# Patient Record
Sex: FEMALE | Race: WHITE | Employment: FULL TIME | URBAN - METROPOLITAN AREA
[De-identification: names, ages, dates, MRNs, and addresses within clinical notes are randomized per-mention and may not be internally consistent; named-entity substitution may affect disease eponyms.]

---

## 2017-01-27 ENCOUNTER — HOSPITAL ENCOUNTER (OUTPATIENT)
Dept: GENERAL RADIOLOGY | Age: 32
Discharge: HOME | End: 2017-01-27
Payer: COMMERCIAL

## 2017-01-27 DIAGNOSIS — M54.50 LOW BACK PAIN: ICD-10-CM

## 2017-01-27 PROCEDURE — 72170 X-RAY EXAM OF PELVIS: CPT

## 2018-11-13 ENCOUNTER — HOSPITAL ENCOUNTER (EMERGENCY)
Age: 33
Discharge: HOME | End: 2018-11-14
Attending: EMERGENCY MEDICINE
Payer: COMMERCIAL

## 2018-11-13 ENCOUNTER — APPOINTMENT (OUTPATIENT)
Dept: CT IMAGING | Age: 33
End: 2018-11-13
Attending: PHYSICIAN ASSISTANT
Payer: COMMERCIAL

## 2018-11-13 ENCOUNTER — OFFICE VISIT (OUTPATIENT)
Dept: URGENT CARE | Age: 33
End: 2018-11-13

## 2018-11-13 VITALS
WEIGHT: 125 LBS | OXYGEN SATURATION: 99 % | SYSTOLIC BLOOD PRESSURE: 145 MMHG | RESPIRATION RATE: 18 BRPM | TEMPERATURE: 98.7 F | HEIGHT: 64 IN | BODY MASS INDEX: 21.34 KG/M2 | DIASTOLIC BLOOD PRESSURE: 100 MMHG | HEART RATE: 97 BPM

## 2018-11-13 VITALS
DIASTOLIC BLOOD PRESSURE: 103 MMHG | OXYGEN SATURATION: 100 % | WEIGHT: 124.56 LBS | TEMPERATURE: 98.7 F | RESPIRATION RATE: 17 BRPM | BODY MASS INDEX: 21.27 KG/M2 | HEIGHT: 64 IN | HEART RATE: 80 BPM | SYSTOLIC BLOOD PRESSURE: 141 MMHG

## 2018-11-13 DIAGNOSIS — R63.0 DECREASED APPETITE: ICD-10-CM

## 2018-11-13 DIAGNOSIS — R10.31 ABDOMINAL PAIN, RIGHT LOWER QUADRANT: Primary | ICD-10-CM

## 2018-11-13 DIAGNOSIS — R10.30 LOWER ABDOMINAL PAIN: Primary | ICD-10-CM

## 2018-11-13 DIAGNOSIS — N20.0 KIDNEY STONES: ICD-10-CM

## 2018-11-13 DIAGNOSIS — R10.32 ABDOMINAL PAIN, LLQ (LEFT LOWER QUADRANT): ICD-10-CM

## 2018-11-13 DIAGNOSIS — R10.823 RIGHT LOWER QUADRANT ABDOMINAL TENDERNESS WITH REBOUND TENDERNESS: ICD-10-CM

## 2018-11-13 LAB
ALBUMIN SERPL-MCNC: 4 G/DL (ref 3.5–5)
ALBUMIN/GLOB SERPL: 1.1 {RATIO} (ref 1.1–2.2)
ALP SERPL-CCNC: 45 U/L (ref 45–117)
ALT SERPL-CCNC: 24 U/L (ref 12–78)
ANION GAP SERPL CALC-SCNC: 7 MMOL/L (ref 5–15)
AST SERPL-CCNC: 14 U/L (ref 15–37)
BILIRUB SERPL-MCNC: 0.4 MG/DL (ref 0.2–1)
BILIRUB UR QL STRIP: NEGATIVE
BUN SERPL-MCNC: 10 MG/DL (ref 6–20)
BUN/CREAT SERPL: 16 (ref 12–20)
CALCIUM SERPL-MCNC: 9.1 MG/DL (ref 8.5–10.1)
CHLORIDE SERPL-SCNC: 104 MMOL/L (ref 97–108)
CO2 SERPL-SCNC: 27 MMOL/L (ref 21–32)
CREAT SERPL-MCNC: 0.63 MG/DL (ref 0.55–1.02)
ERYTHROCYTE [DISTWIDTH] IN BLOOD BY AUTOMATED COUNT: 11.1 % (ref 11.5–14.5)
GLOBULIN SER CALC-MCNC: 3.5 G/DL (ref 2–4)
GLUCOSE SERPL-MCNC: 88 MG/DL (ref 65–100)
GLUCOSE UR-MCNC: NEGATIVE MG/DL
HCT VFR BLD AUTO: 36.4 % (ref 35–47)
HGB BLD-MCNC: 12.1 G/DL (ref 11.5–16)
KETONES P FAST UR STRIP-MCNC: NEGATIVE MG/DL
LIPASE SERPL-CCNC: 69 U/L (ref 73–393)
MCH RBC QN AUTO: 32.4 PG (ref 26–34)
MCHC RBC AUTO-ENTMCNC: 33.2 G/DL (ref 30–36.5)
MCV RBC AUTO: 97.6 FL (ref 80–99)
NRBC # BLD: 0 K/UL (ref 0–0.01)
NRBC BLD-RTO: 0 PER 100 WBC
PH UR STRIP: 6.5 [PH] (ref 4.6–8)
PLATELET # BLD AUTO: 256 K/UL (ref 150–400)
PMV BLD AUTO: 9.7 FL (ref 8.9–12.9)
POTASSIUM SERPL-SCNC: 4 MMOL/L (ref 3.5–5.1)
PROT SERPL-MCNC: 7.5 G/DL (ref 6.4–8.2)
PROT UR QL STRIP: NEGATIVE
RBC # BLD AUTO: 3.73 M/UL (ref 3.8–5.2)
SODIUM SERPL-SCNC: 138 MMOL/L (ref 136–145)
SP GR UR STRIP: 1.01 (ref 1–1.03)
UA UROBILINOGEN AMB POC: NORMAL (ref 0.2–1)
URINALYSIS CLARITY POC: NORMAL
URINALYSIS COLOR POC: NORMAL
URINE BLOOD POC: NORMAL
URINE LEUKOCYTES POC: NEGATIVE
URINE NITRITES POC: NEGATIVE
WBC # BLD AUTO: 7.4 K/UL (ref 3.6–11)

## 2018-11-13 PROCEDURE — 74176 CT ABD & PELVIS W/O CONTRAST: CPT

## 2018-11-13 PROCEDURE — 83690 ASSAY OF LIPASE: CPT

## 2018-11-13 PROCEDURE — 85027 COMPLETE CBC AUTOMATED: CPT

## 2018-11-13 PROCEDURE — 36415 COLL VENOUS BLD VENIPUNCTURE: CPT

## 2018-11-13 PROCEDURE — 80053 COMPREHEN METABOLIC PANEL: CPT

## 2018-11-13 PROCEDURE — 87210 SMEAR WET MOUNT SALINE/INK: CPT

## 2018-11-13 PROCEDURE — 81001 URINALYSIS AUTO W/SCOPE: CPT

## 2018-11-13 PROCEDURE — 99283 EMERGENCY DEPT VISIT LOW MDM: CPT

## 2018-11-13 PROCEDURE — 87491 CHLMYD TRACH DNA AMP PROBE: CPT

## 2018-11-13 NOTE — PATIENT INSTRUCTIONS
Go to one of the following below immediately without delay:    [address]   ([address])  Carlos Manuel Beyer   [phone]   Open 24 hours    [facility]   ([address])  Jami Ty   [phone] 24 hours    [place] [address]  ([address])  [address] [place] [address], [id number] [place] [address]   [phone]

## 2018-11-13 NOTE — PROGRESS NOTES
48 hours of progressively worsening lower abdominal pain  Improved at rest. Worse with any movement including walking  Associated: decreased appetite and distension sensation. Pain is non radiating. 7/10 with exacerbation. Has not tried any particular therapies for relief  Normal BMs daily. LMP 10/29/2018 lasting 7 days mildly more heavy than usual but not concerning to her. Is sexually active with . Denies any vaginal discharge. GI history: PSH tubal ligation, current work up with GI for possible celiac  Specifically denies: n/v/d, fever/chills, dysuria/increased urine frequency/urinary urgency, recent heavy lifting, constipation, vaginal discharge             No past medical history on file. Past Surgical History:   Procedure Laterality Date    HX GYN           Family History   Problem Relation Age of Onset    Hypertension Father         Social History     Socioeconomic History    Marital status:      Spouse name: Not on file    Number of children: Not on file    Years of education: Not on file    Highest education level: Not on file   Social Needs    Financial resource strain: Not on file    Food insecurity - worry: Not on file    Food insecurity - inability: Not on file   [employer] needs - medical: Not on file   [employer] needs - non-medical: Not on file   Occupational History    Not on file   Tobacco Use    Smoking status: Never Smoker    Smokeless tobacco: Former User   Substance and Sexual Activity    Alcohol use: Yes    Drug use: Not on file    Sexual activity: Not on file   Other Topics Concern    Not on file   Social History Narrative    Not on file                ALLERGIES: Codeine    Review of Systems   All other systems reviewed and are negative.       Vitals:    11/13/18 1632   BP: (!) 145/100   Pulse: 97   Resp: 18   Temp: 98.7 °F (37.1 °C)   SpO2: 99%   Weight: 125 lb (56.7 kg)   Height: 5' 4\" (1.626 m)       Physical Exam   Constitutional:   Non toxic appearing   HENT:   Mouth/Throat: Oropharynx is clear and moist.   Eyes: EOM are normal. Pupils are equal, round, and reactive to light. Cardiovascular: Regular rhythm and normal heart sounds. Exam reveals no gallop and no friction rub. No murmur heard. Apical pulse 104bpm   Pulmonary/Chest: Effort normal and breath sounds normal. No respiratory distress. She has no wheezes. She has no rales. Abdominal: Soft. Bowel sounds are normal. There is no hepatosplenomegaly. There is tenderness in the right lower quadrant. There is rebound. There is no rigidity, no guarding, no tenderness at McBurney's point and negative Mitchell's sign. MDM     Differential Diagnosis; Clinical Impression; Plan:       CLINICAL IMPRESSION:  (R10.30) Lower abdominal pain  (primary encounter diagnosis)  (R63.0) Decreased appetite  (R10.823) Right lower quadrant abdominal tenderness with rebound tenderness    Orders Placed This Encounter      AMB POC URINALYSIS DIP STICK AUTO W/ MICRO    DDx include appendicitis, adnexal pain, gastritis, urinary calculi, hernia    Plan:  UA with 1+ blood no GANESH or NIT. Have referred to ED r/o appendicitis; mildly tachy, RLQ pain on exam with rebound tenderness, decreased appetite. Will be riding by car. Aware to go to [facility] ED immediatly after discharge.             Procedures

## 2018-11-14 LAB
APPEARANCE UR: CLEAR
BACTERIA URNS QL MICRO: NEGATIVE /HPF
BILIRUB UR QL: NEGATIVE
C TRACH DNA SPEC QL NAA+PROBE: NEGATIVE
CLUE CELLS VAG QL WET PREP: NORMAL
COLOR UR: ABNORMAL
EPITH CASTS URNS QL MICRO: ABNORMAL /LPF
GLUCOSE UR STRIP.AUTO-MCNC: NEGATIVE MG/DL
HGB UR QL STRIP: ABNORMAL
HYALINE CASTS URNS QL MICRO: ABNORMAL /LPF (ref 0–5)
KETONES UR QL STRIP.AUTO: ABNORMAL MG/DL
KOH PREP SPEC: NORMAL
LEUKOCYTE ESTERASE UR QL STRIP.AUTO: NEGATIVE
N GONORRHOEA DNA SPEC QL NAA+PROBE: NEGATIVE
NITRITE UR QL STRIP.AUTO: NEGATIVE
PH UR STRIP: 6 [PH] (ref 5–8)
PROT UR STRIP-MCNC: NEGATIVE MG/DL
RBC #/AREA URNS HPF: ABNORMAL /HPF (ref 0–5)
SAMPLE TYPE: NORMAL
SERVICE CMNT-IMP: NORMAL
SERVICE CMNT-IMP: NORMAL
SP GR UR REFRACTOMETRY: 1.01 (ref 1–1.03)
SPECIMEN SOURCE: NORMAL
T VAGINALIS VAG QL WET PREP: NORMAL
UROBILINOGEN UR QL STRIP.AUTO: 0.2 EU/DL (ref 0.2–1)
WBC URNS QL MICRO: ABNORMAL /HPF (ref 0–4)

## 2018-11-14 RX ORDER — DICYCLOMINE HYDROCHLORIDE 20 MG/1
20 TABLET ORAL
Qty: 20 TAB | Refills: 0 | Status: SHIPPED | OUTPATIENT
Start: 2018-11-14 | End: 2019-05-11

## 2018-11-14 NOTE — ED PROVIDER NOTES
EMERGENCY DEPARTMENT HISTORY AND PHYSICAL EXAM      Date: 11/13/2018  Patient Name: Mariah Braynt    History of Presenting Illness     Chief Complaint   Patient presents with    Abdominal Pain     intermittent right side abdominal pain since Sunday. Denies N/V/D       History Provided By: Patient    HPI: Mariah Bryant, 28 y.o. female with no pertinent PMHx, presents ambulatory to the ED with cc of gradual onset, constant, diffuse lower abd pain x 2 days with associated mild nausea. Pain is described as a 2/10 soreness at rest, but jumping to an 8/10 upon movement or after eating. Pt states her symptoms began 2 days ago and persistence prompted the trip to the ED for evaluation. She reports going to Urgent Care earlier today who referred her here for further workup. Pt has a PSHx of a tubal ligation back in 2010. She has a FHx significant for HTN. Pt denies smoking with occasional EtOh consumption. Pt specifically denies constipation, urine symptoms, vaginal discharge, CP, SOB or a HA. There are no other complaints, changes, or physical findings at this time. PCP: Shane Davalos MD    Current Outpatient Medications   Medication Sig Dispense Refill    dicyclomine (BENTYL) 20 mg tablet Take 1 Tab by mouth every six (6) hours as needed (abdominal cramps) for up to 20 doses. 20 Tab 0    Cetirizine (ZYRTEC) 10 mg cap Take 10 mg by mouth daily.  fluticasone propionate (FLONASE ALLERGY RELIEF NA) by Nasal route daily. Past History     Past Medical History:  No past medical history on file. Past Surgical History:  Past Surgical History:   Procedure Laterality Date    HX GYN         Family History:  Family History   Problem Relation Age of Onset    Hypertension Father        Social History:  Social History     Tobacco Use    Smoking status: Never Smoker    Smokeless tobacco: Former User   Substance Use Topics    Alcohol use: Yes    Drug use: Not on file       Allergies:   Allergies Allergen Reactions    Codeine Nausea and Vomiting         Review of Systems   Review of Systems   Constitutional: Negative for fever. HENT: Negative for congestion. Eyes: Negative. Respiratory: Negative for shortness of breath. Cardiovascular: Negative for chest pain. Gastrointestinal: Positive for abdominal pain and nausea. Negative for constipation, diarrhea and vomiting. Endocrine: Negative for heat intolerance. Genitourinary: Negative for dysuria and vaginal discharge. Musculoskeletal: Negative for back pain. Skin: Negative for rash. Allergic/Immunologic: Negative for immunocompromised state. Neurological: Negative for headaches. Hematological: Does not bruise/bleed easily. Psychiatric/Behavioral: Negative. All other systems reviewed and are negative. Physical Exam   Physical Exam   Constitutional: She is oriented to person, place, and time. She appears well-developed and well-nourished. No distress. HENT:   Head: Normocephalic. Eyes: EOM are normal.   Neck: Normal range of motion. Neck supple. Cardiovascular: Normal rate, regular rhythm, normal heart sounds and intact distal pulses. Pulmonary/Chest: Effort normal and breath sounds normal. No respiratory distress. Abdominal: Soft. Bowel sounds are normal. There is tenderness (B/L lower quadrant). Genitourinary:   Genitourinary Comments: No discharge  No tenderness   Musculoskeletal: Normal range of motion. Neurological: She is alert and oriented to person, place, and time. Skin: Skin is warm and dry. Psychiatric: She has a normal mood and affect. Her behavior is normal.   Nursing note and vitals reviewed.       Diagnostic Study Results     Labs -     Recent Results (from the past 12 hour(s))   AMB POC URINALYSIS DIP STICK AUTO W/ MICRO    Collection Time: 11/13/18  4:41 PM   Result Value Ref Range    Color (UA POC)      Clarity (UA POC)      Glucose (UA POC) Negative Negative    Bilirubin (UA POC) Negative Negative    Ketones (UA POC) Negative Negative    Specific gravity (UA POC) 1.015 1.001 - 1.035    Blood (UA POC) 1+ Negative    pH (UA POC) 6.5 4.6 - 8.0    Protein (UA POC) Negative Negative    Urobilinogen (UA POC) 0.2 mg/dL 0.2 - 1    Nitrites (UA POC) Negative Negative    Leukocyte esterase (UA POC) Negative Negative   CBC W/O DIFF    Collection Time: 11/13/18  6:06 PM   Result Value Ref Range    WBC 7.4 3.6 - 11.0 K/uL    RBC 3.73 (L) 3.80 - 5.20 M/uL    HGB 12.1 11.5 - 16.0 g/dL    HCT 36.4 35.0 - 47.0 %    MCV 97.6 80.0 - 99.0 FL    MCH 32.4 26.0 - 34.0 PG    MCHC 33.2 30.0 - 36.5 g/dL    RDW 11.1 (L) 11.5 - 14.5 %    PLATELET 995 119 - 988 K/uL    MPV 9.7 8.9 - 12.9 FL    NRBC 0.0 0  WBC    ABSOLUTE NRBC 0.00 0.00 - 5.16 K/uL   METABOLIC PANEL, COMPREHENSIVE    Collection Time: 11/13/18  6:06 PM   Result Value Ref Range    Sodium 138 136 - 145 mmol/L    Potassium 4.0 3.5 - 5.1 mmol/L    Chloride 104 97 - 108 mmol/L    CO2 27 21 - 32 mmol/L    Anion gap 7 5 - 15 mmol/L    Glucose 88 65 - 100 mg/dL    BUN 10 6 - 20 MG/DL    Creatinine 0.63 0.55 - 1.02 MG/DL    BUN/Creatinine ratio 16 12 - 20      GFR est AA >60 >60 ml/min/1.73m2    GFR est non-AA >60 >60 ml/min/1.73m2    Calcium 9.1 8.5 - 10.1 MG/DL    Bilirubin, total 0.4 0.2 - 1.0 MG/DL    ALT (SGPT) 24 12 - 78 U/L    AST (SGOT) 14 (L) 15 - 37 U/L    Alk.  phosphatase 45 45 - 117 U/L    Protein, total 7.5 6.4 - 8.2 g/dL    Albumin 4.0 3.5 - 5.0 g/dL    Globulin 3.5 2.0 - 4.0 g/dL    A-G Ratio 1.1 1.1 - 2.2     LIPASE    Collection Time: 11/13/18  6:06 PM   Result Value Ref Range    Lipase 69 (L) 73 - 393 U/L   URINALYSIS W/ RFLX MICROSCOPIC    Collection Time: 11/13/18 11:27 PM   Result Value Ref Range    Color YELLOW/STRAW      Appearance CLEAR CLEAR      Specific gravity 1.015 1.003 - 1.030      pH (UA) 6.0 5.0 - 8.0      Protein NEGATIVE  NEG mg/dL    Glucose NEGATIVE  NEG mg/dL    Ketone TRACE (A) NEG mg/dL    Bilirubin NEGATIVE  NEG      Blood SMALL (A) NEG      Urobilinogen 0.2 0.2 - 1.0 EU/dL    Nitrites NEGATIVE  NEG      Leukocyte Esterase NEGATIVE  NEG      WBC 0-4 0 - 4 /hpf    RBC 5-10 0 - 5 /hpf    Epithelial cells FEW FEW /lpf    Bacteria NEGATIVE  NEG /hpf    Hyaline cast 0-2 0 - 5 /lpf   WET PREP    Collection Time: 11/13/18 11:27 PM   Result Value Ref Range    Clue cells CLUE CELLS ABSENT      Wet prep NO TRICHOMONAS SEEN     KOH, OTHER SOURCES    Collection Time: 11/13/18 11:27 PM   Result Value Ref Range    Special Requests: NO SPECIAL REQUESTS      KOH NO YEAST SEEN         Radiologic Studies -   CT Results  (Last 48 hours)               11/13/18 1938  CT ABD PELV WO CONT Final result    Impression:  IMPRESSION:    1. Nonobstructive bilateral nephrolithiasis. 2. Trace free fluid in the pelvis. Narrative:  INDICATION: Abdominal pain, intermittent right-sided abdominal pain since   sunday. Exam: Noncontrast CT of the abdomen and pelvis is performed with 5 mm   collimation. Sagittal and coronal reformatted images were also performed. CT dose reduction was achieved through the use of a standardized protocol   tailored for this examination and automatic exposure control for dose   modulation. There is no prior study for direct comparison. FINDINGS:       The visualized lung bases are clear. Abdomen:        Liver: The liver is normal on noncontrast images. Spleen: The spleen is normal on noncontrast images. Adrenals: The adrenals are normal on noncontrast images. Pancreas: The pancreas is normal on noncontrast images. Gallbladder: The gallbladder is normal on noncontrast images. Kidneys: There is bilateral nonobstructing nephrolithiasis. Largest right renal   calculus measures 9 mm in size. Largest left renal calculus measures 3 mm in   size. There is no hydronephrosis. Bowel: No thickened or dilated loop of large or small bowel seen.         Appendix: The appendix is not visualized, however there are no secondary signs   of acute appendicitis. Pelvis: Urinary bladder is partially filled and grossly normal.       Miscellaneous: There is trace free fluid in the pelvis. There is no free   intraperitoneal gas. There is no focal fluid collection to suggest abscess. Bilateral tubal ligation clips are noted. Medical Decision Making   I am the first provider for this patient. I reviewed the vital signs, available nursing notes, past medical history, past surgical history, family history and social history. Vital Signs-Reviewed the patient's vital signs. Patient Vitals for the past 12 hrs:   Temp Pulse Resp BP SpO2   11/13/18 1743 98.7 °F (37.1 °C) 80 17 (!) 141/103 100 %       Pulse Oximetry Analysis - 100% on RA    Cardiac Monitor:   Rate: 80 bpm  Rhythm: Normal Sinus Rhythm     Records Reviewed: Nursing Notes and Old Medical Records    Provider Notes (Medical Decision Making):   DDx: appendicitis, diverticulitis, ovarian cyst, UTI, PID, kidney stone, constipation    ED Course:   Initial assessment performed. The patients presenting problems have been discussed, and they are in agreement with the care plan formulated and outlined with them. I have encouraged them to ask questions as they arise throughout their visit. Procedure Note - Pelvic Exam:    11:15 PM  Performed by: Keila Matthew MD  Chaperoned by: RN  Pelvic exam was performed using bimanual and speculum. Further findings noted in physical exam.   The procedure took 1-15 minutes, and pt tolerated well. 12:19 AM  Pt has been re-evaluated and is ready for discharge. Critical Care Time:   0    Disposition:  DISCHARGE NOTE:  12:20 AM  The patient is ready for discharge. The patients signs, symptoms, diagnosis, and instructions for discharge have been discussed and the pt has conveyed their understanding.  The patient is to follow up as recommended or return to the ER should their symptoms worsen. Plan has been discussed and patient has conveyed their agreement. PLAN:  1. Discharge  Current Discharge Medication List      START taking these medications    Details   dicyclomine (BENTYL) 20 mg tablet Take 1 Tab by mouth every six (6) hours as needed (abdominal cramps) for up to 20 doses. Qty: 20 Tab, Refills: 0           2. Follow-up Information     Follow up With Specialties Details Why Contact Info    Chika Del Castillo MD Gastroenterology Call in 1 day  [address]      Shannan Benedict MD Internal Medicine  As needed [address] [place] [address] [phone]      [facility] EMERGENCY DEPT Emergency Medicine  If symptoms worsen [address]  [address] [facility]  [phone]        Return to ED if worse     Diagnosis     Clinical Impression:   1. Abdominal pain, right lower quadrant    2. Abdominal pain, LLQ (left lower quadrant)    3. Kidney stones        Attestations: This note is prepared by Cait Thompson, acting as Scribe for Rustam Baeza MD.    Rustam Baeza MD: The scribe's documentation has been prepared under my direction and personally reviewed by me in its entirety. I confirm that the note above accurately reflects all work, treatment, procedures, and medical decision making performed by me.

## 2018-11-14 NOTE — DISCHARGE INSTRUCTIONS
Abdominal Pain: Care Instructions  Your Care Instructions    Abdominal pain has many possible causes. Some aren't serious and get better on their own in a few days. Others need more testing and treatment. If your pain continues or gets worse, you need to be rechecked and may need more tests to find out what is wrong. You may need surgery to correct the problem. Don't ignore new symptoms, such as fever, nausea and vomiting, urination problems, pain that gets worse, and dizziness. These may be signs of a more serious problem. Your doctor may have recommended a follow-up visit in the next 8 to 12 hours. If you are not getting better, you may need more tests or treatment. The doctor has checked you carefully, but problems can develop later. If you notice any problems or new symptoms, get medical treatment right away. Follow-up care is a key part of your treatment and safety. Be sure to make and go to all appointments, and call your doctor if you are having problems. It's also a good idea to know your test results and keep a list of the medicines you take. How can you care for yourself at home? · Rest until you feel better. · To prevent dehydration, drink plenty of fluids, enough so that your urine is light yellow or clear like water. Choose water and other caffeine-free clear liquids until you feel better. If you have kidney, heart, or liver disease and have to limit fluids, talk with your doctor before you increase the amount of fluids you drink. · If your stomach is upset, eat mild foods, such as rice, dry toast or crackers, bananas, and applesauce. Try eating several small meals instead of two or three large ones. · Wait until 48 hours after all symptoms have gone away before you have spicy foods, alcohol, and drinks that contain caffeine. · Do not eat foods that are high in fat. · Avoid anti-inflammatory medicines such as aspirin, ibuprofen (Advil, Motrin), and naproxen (Aleve).  These can cause stomach upset. Talk to your doctor if you take daily aspirin for another health problem. When should you call for help? Call 911 anytime you think you may need emergency care. For example, call if:    · You passed out (lost consciousness).     · You pass maroon or very bloody stools.     · You vomit blood or what looks like coffee grounds.     · You have new, severe belly pain.    Call your doctor now or seek immediate medical care if:    · Your pain gets worse, especially if it becomes focused in one area of your belly.     · You have a new or higher fever.     · Your stools are black and look like tar, or they have streaks of blood.     · You have unexpected vaginal bleeding.     · You have symptoms of a urinary tract infection. These may include:  ? Pain when you urinate. ? Urinating more often than usual.  ? Blood in your urine.     · You are dizzy or lightheaded, or you feel like you may faint.    Watch closely for changes in your health, and be sure to contact your doctor if:    · You are not getting better after 1 day (24 hours). Where can you learn more? Go to http://katarina-patricia.info/. Enter D619 in the search box to learn more about \"Abdominal Pain: Care Instructions. \"  Current as of: November 20, 2017  Content Version: 11.8  © 5362-4464 [employer]. Care instructions adapted under license by [employer] (which disclaims liability or warranty for this information). If you have questions about a medical condition or this instruction, always ask your healthcare professional. [place] [address] any warranty or liability for your use of this information. Kidney Stone: Care Instructions  Your Care Instructions    Kidney stones are formed when salts, minerals, and other substances normally found in the urine clump together. They can be as small as grains of sand or, rarely, as large as golf balls.   While the stone is traveling through the ureter, which is the tube that carries urine from the kidney to the bladder, you will probably feel pain. The pain may be mild or very severe. You may also have some blood in your urine. As soon as the stone reaches the bladder, any intense pain should go away. If a stone is too large to pass on its own, you may need a medical procedure to help you pass the stone. The doctor has checked you carefully, but problems can develop later. If you notice any problems or new symptoms, get medical treatment right away. Follow-up care is a key part of your treatment and safety. Be sure to make and go to all appointments, and call your doctor if you are having problems. It's also a good idea to know your test results and keep a list of the medicines you take. How can you care for yourself at home? · Drink plenty of fluids, enough so that your urine is light yellow or clear like water. If you have kidney, heart, or liver disease and have to limit fluids, talk with your doctor before you increase the amount of fluids you drink. · Take pain medicines exactly as directed. Call your doctor if you think you are having a problem with your medicine. ? If the doctor gave you a prescription medicine for pain, take it as prescribed. ? If you are not taking a prescription pain medicine, ask your doctor if you can take an over-the-counter medicine. Read and follow all instructions on the label. · Your doctor may ask you to strain your urine so that you can collect your kidney stone when it passes. You can use a kitchen strainer or a tea strainer to catch the stone. Store it in a plastic bag until you see your doctor again. Preventing future kidney stones  Some changes in your diet may help prevent kidney stones. Depending on the cause of your stones, your doctor may recommend that you:  · Drink plenty of fluids, enough so that your urine is light yellow or clear like water.  If you have kidney, heart, or liver disease and have to limit fluids, talk with your doctor before you increase the amount of fluids you drink. · Limit coffee, tea, and alcohol. Also avoid grapefruit juice. · Do not take more than the recommended daily dose of vitamins C and D.  · Avoid antacids such as Gaviscon, Maalox, Mylanta, or Tums. · Limit the amount of salt (sodium) in your diet. · Eat a balanced diet that is not too high in protein. · Limit foods that are high in a substance called oxalate, which can cause kidney stones. These foods include dark green vegetables, rhubarb, chocolate, wheat bran, nuts, cranberries, and beans. When should you call for help? Call your doctor now or seek immediate medical care if:    · You cannot keep down fluids.     · Your pain gets worse.     · You have a fever or chills.     · You have new or worse pain in your back just below your rib cage (the flank area).     · You have new or more blood in your urine.    Watch closely for changes in your health, and be sure to contact your doctor if:    · You do not get better as expected. Where can you learn more? Go to http://katarina-patricia.info/. Enter C758 in the search box to learn more about \"Kidney Stone: Care Instructions. \"  Current as of: March 15, 2018  Content Version: 11.8  © 4417-2248 [employer]. Care instructions adapted under license by [employer] (which disclaims liability or warranty for this information). If you have questions about a medical condition or this instruction, always ask your healthcare professional. [place] [address] any warranty or liability for your use of this information.

## 2019-05-11 ENCOUNTER — OFFICE VISIT (OUTPATIENT)
Dept: URGENT CARE | Age: 34
End: 2019-05-11

## 2019-05-11 VITALS
BODY MASS INDEX: 22.02 KG/M2 | RESPIRATION RATE: 18 BRPM | WEIGHT: 129 LBS | HEIGHT: 64 IN | SYSTOLIC BLOOD PRESSURE: 166 MMHG | DIASTOLIC BLOOD PRESSURE: 108 MMHG | TEMPERATURE: 98.6 F | OXYGEN SATURATION: 98 % | HEART RATE: 88 BPM

## 2019-05-11 DIAGNOSIS — J06.9 ACUTE URI: ICD-10-CM

## 2019-05-11 DIAGNOSIS — H69.92 EUSTACHIAN TUBE DISORDER, LEFT: Primary | ICD-10-CM

## 2019-05-11 RX ORDER — PREDNISONE 20 MG/1
20 TABLET ORAL 2 TIMES DAILY
Qty: 10 TAB | Refills: 0 | Status: SHIPPED | OUTPATIENT
Start: 2019-05-11 | End: 2019-05-16

## 2019-05-11 NOTE — PATIENT INSTRUCTIONS
Fluids/ gargles  Claritin/ allegra   Tylenol cold-sinus - max strength 1-2 tab 4 times/ day    with Advil as needed    If not better in 4-5 days - may use prednisone       Eustachian Tube Problems: Care Instructions  Your Care Instructions    The eustachian (say \"you-STAY-shee-un\") tubes run between the inside of the ears and the throat. They keep air pressure stable in the ears. If your eustachian tubes become blocked, the air pressure in your ears changes. The fluids from a cold can clog eustachian tubes, causing pain in the ears. A quick change in air pressure can cause eustachian tubes to close up. This might happen when an airplane changes altitude or when a  goes up or down underwater. Eustachian tube problems often clear up on their own or after antibiotic treatment. If your tubes continue to be blocked, you may need surgery. Follow-up care is a key part of your treatment and safety. Be sure to make and go to all appointments, and call your doctor if you are having problems. It's also a good idea to know your test results and keep a list of the medicines you take. How can you care for yourself at home? · To ease ear pain, apply a warm washcloth or a heating pad set on low. There may be some drainage from the ear when the heat melts earwax. Put a cloth between the heat source and your skin. Do not use a heating pad with children. · If your doctor prescribed antibiotics, take them as directed. Do not stop taking them just because you feel better. You need to take the full course of antibiotics. · Your doctor may recommend over-the-counter medicine. Be safe with medicines. Oral or nasal decongestants may relieve ear pain. Avoid decongestants that are combined with antihistamines, which tend to cause more blockage. But if allergies seem to be the problem, your doctor may recommend a combination. Be careful with cough and cold medicines.  Don't give them to children younger than 6, because they don't work for children that age and can even be harmful. For children 6 and older, always follow all the instructions carefully. Make sure you know how much medicine to give and how long to use it. And use the dosing device if one is included. When should you call for help? Call your doctor now or seek immediate medical care if:    · You develop sudden, complete hearing loss.     · You have severe pain or feel dizzy.     · You have new or increasing pus or blood draining from your ear.     · You have redness, swelling, or pain around or behind the ear.    Watch closely for changes in your health, and be sure to contact your doctor if:    · You do not get better after 2 weeks.     · You have any new symptoms, such as itching or a feeling of fullness in the ear. Where can you learn more? Go to http://katarina-patricia.info/. Enter Y822 in the search box to learn more about \"Eustachian Tube Problems: Care Instructions. \"  Current as of: March 27, 2018  Content Version: 11.9  © 3649-2189 [employer]. Care instructions adapted under license by [employer] (which disclaims liability or warranty for this information). If you have questions about a medical condition or this instruction, always ask your healthcare professional. [address] any warranty or liability for your use of this information.

## 2019-05-12 NOTE — PROGRESS NOTES
Cold Symptoms   The history is provided by the patient. This is a new problem. The problem occurs constantly. Associated symptoms include ear congestion and ear pain. She has tried nothing for the symptoms. She is not a smoker. Her past medical history does not include asthma. History reviewed. No pertinent past medical history. Past Surgical History:   Procedure Laterality Date    HX GYN           Family History   Problem Relation Age of Onset    Hypertension Father         Social History     Socioeconomic History    Marital status:      Spouse name: Not on file    Number of children: Not on file    Years of education: Not on file    Highest education level: Not on file   Occupational History    Not on file   Social Needs    Financial resource strain: Not on file    Food insecurity:     Worry: Not on file     Inability: Not on file    Transportation needs:     Medical: Not on file     Non-medical: Not on file   Tobacco Use    Smoking status: Never Smoker    Smokeless tobacco: Former User   Substance and Sexual Activity    Alcohol use: Yes    Drug use: Not on file    Sexual activity: Not on file   Lifestyle    Physical activity:     Days per week: Not on file     Minutes per session: Not on file    Stress: Not on file   Relationships    Social connections:     Talks on phone: Not on file     Gets together: Not on file     Attends Hinduism service: Not on file     Active member of club or organization: Not on file     Attends meetings of clubs or organizations: Not on file     Relationship status: Not on file    Intimate partner violence:     Fear of current or ex partner: Not on file     Emotionally abused: Not on file     Physically abused: Not on file     Forced sexual activity: Not on file   Other Topics Concern    Not on file   Social History Narrative    Not on file                ALLERGIES: Codeine    Review of Systems   Constitutional: Negative for fatigue.    HENT: Positive for congestion, ear pain and sinus pain. All other systems reviewed and are negative. Vitals:    05/11/19 1354   BP: (!) 166/108   Pulse: 88   Resp: 18   Temp: 98.6 °F (37 °C)   SpO2: 98%   Weight: 129 lb (58.5 kg)   Height: 5' 4\" (1.626 m)       Physical Exam   Constitutional: No distress. HENT:   Right Ear: Tympanic membrane and ear canal normal.   Left Ear: Tympanic membrane and ear canal normal.   Nose: Nose normal.   Mouth/Throat: No oropharyngeal exudate, posterior oropharyngeal edema or posterior oropharyngeal erythema. Eyes: Conjunctivae are normal. Right eye exhibits no discharge. Left eye exhibits no discharge. Neck: Neck supple. Pulmonary/Chest: Effort normal and breath sounds normal. No respiratory distress. She has no wheezes. She has no rales. Lymphadenopathy:     She has no cervical adenopathy. Skin: No rash noted. Nursing note and vitals reviewed. MDM    Procedures      ICD-10-CM ICD-9-CM    1. Eustachian tube disorder, left H69.92 381.9    2. Acute URI J06.9 465.9      Fluids/ gargles  Claritin/ allegra   Tylenol cold-sinus - max strength 1-2 tab 4 times/ day    with Advil as needed          Medications Ordered Today   Medications    predniSONE (DELTASONE) 20 mg tablet     Sig: Take 20 mg by mouth two (2) times a day for 5 days. With food     Dispense:  10 Tab     Refill:  0     No results found for any visits on 05/11/19. The patients condition was discussed with the patient and they understand. The patient is to follow up with primary care doctor. If signs and symptoms become worse the pt is to go to the ER. The patient is to take medications as prescribed.

## 2019-09-19 ENCOUNTER — OFFICE VISIT (OUTPATIENT)
Dept: URGENT CARE | Age: 34
End: 2019-09-19

## 2019-09-19 VITALS
WEIGHT: 129 LBS | TEMPERATURE: 98.5 F | HEART RATE: 64 BPM | RESPIRATION RATE: 18 BRPM | HEIGHT: 64 IN | BODY MASS INDEX: 22.02 KG/M2 | DIASTOLIC BLOOD PRESSURE: 96 MMHG | OXYGEN SATURATION: 100 % | SYSTOLIC BLOOD PRESSURE: 139 MMHG

## 2019-09-19 DIAGNOSIS — J01.00 ACUTE NON-RECURRENT MAXILLARY SINUSITIS: Primary | ICD-10-CM

## 2019-09-19 RX ORDER — AMOXICILLIN 875 MG/1
875 TABLET, FILM COATED ORAL EVERY 12 HOURS
Qty: 20 TAB | Refills: 0 | Status: SHIPPED | OUTPATIENT
Start: 2019-09-19 | End: 2019-09-29

## 2019-09-19 NOTE — PROGRESS NOTES
Layo Flaherty presents with bilateral worsening maxillary sinus pressure and pain for the past 1.5 weeks. Reports had congestion and ST at onset, now improved. Denies fever, chills. Reports mild cough. Sinus pain worse when moving head forward. Takes zyrtec and flonase daily. Used sudafed without improvement. The history is provided by the patient. History reviewed. No pertinent past medical history. Past Surgical History:   Procedure Laterality Date    HX GYN           Family History   Problem Relation Age of Onset    Hypertension Father         Social History     Socioeconomic History    Marital status:      Spouse name: Not on file    Number of children: Not on file    Years of education: Not on file    Highest education level: Not on file   Occupational History    Not on file   Social Needs    Financial resource strain: Not on file    Food insecurity:     Worry: Not on file     Inability: Not on file    Transportation needs:     Medical: Not on file     Non-medical: Not on file   Tobacco Use    Smoking status: Never Smoker    Smokeless tobacco: Former User   Substance and Sexual Activity    Alcohol use:  Yes    Drug use: Not on file    Sexual activity: Not on file   Lifestyle    Physical activity:     Days per week: Not on file     Minutes per session: Not on file    Stress: Not on file   Relationships    Social connections:     Talks on phone: Not on file     Gets together: Not on file     Attends Adventism service: Not on file     Active member of club or organization: Not on file     Attends meetings of clubs or organizations: Not on file     Relationship status: Not on file    Intimate partner violence:     Fear of current or ex partner: Not on file     Emotionally abused: Not on file     Physically abused: Not on file     Forced sexual activity: Not on file   Other Topics Concern    Not on file   Social History Narrative    Not on file                ALLERGIES: Codeine    Review of Systems   Constitutional: Negative for activity change, appetite change, chills and fever. HENT: Positive for congestion, rhinorrhea, sinus pressure and sinus pain. Negative for ear pain, sore throat and trouble swallowing. Respiratory: Positive for cough. Negative for shortness of breath and wheezing. Cardiovascular: Negative for chest pain and palpitations. Gastrointestinal: Negative for nausea and vomiting. Musculoskeletal: Positive for neck pain. Neurological: Negative for dizziness and headaches. Hematological: Positive for adenopathy. Vitals:    09/19/19 1739   BP: (!) 139/96   Pulse: 64   Resp: 18   Temp: 98.5 °F (36.9 °C)   SpO2: 100%   Weight: 129 lb (58.5 kg)   Height: 5' 4\" (1.626 m)       Physical Exam   Constitutional: She appears well-developed and well-nourished. No distress. HENT:   Right Ear: Tympanic membrane, external ear and ear canal normal.   Left Ear: Tympanic membrane, external ear and ear canal normal.   Nose: Right sinus exhibits maxillary sinus tenderness. Right sinus exhibits no frontal sinus tenderness. Left sinus exhibits maxillary sinus tenderness. Left sinus exhibits no frontal sinus tenderness. Mouth/Throat: Mucous membranes are normal. Posterior oropharyngeal erythema present. No oropharyngeal exudate, posterior oropharyngeal edema or tonsillar abscesses. Cardiovascular: Normal rate, regular rhythm and normal heart sounds. Pulmonary/Chest: Effort normal and breath sounds normal. No respiratory distress. She has no wheezes. She has no rales. Lymphadenopathy:     She has cervical adenopathy. Neurological: She is alert. Skin: She is not diaphoretic. Psychiatric: She has a normal mood and affect. Her behavior is normal. Judgment and thought content normal.   Nursing note and vitals reviewed. [facility]    ICD-10-CM ICD-9-CM   1.  Acute non-recurrent maxillary sinusitis J01.00 461.0     Medications Ordered Today   Medications    amoxicillin (AMOXIL) 875 mg tablet     Sig: Take 1 Tab by mouth every twelve (12) hours for 10 days. Dispense:  20 Tab     Refill:  0     Continue zyrtec and flonase daily. The patients condition was discussed with the patient and they understand. The patient is to follow up with PCP. If signs and symptoms become worse the pt is to go to the ER. The patient is to take medications as prescribed. AVS given with patient instructions.             Procedures

## 2019-09-19 NOTE — PATIENT INSTRUCTIONS
Sinusitis: Care Instructions  Your Care Instructions    Sinusitis is an infection of the lining of the sinus cavities in your head. Sinusitis often follows a cold. It causes pain and pressure in your head and face. In most cases, sinusitis gets better on its own in 1 to 2 weeks. But some mild symptoms may last for several weeks. Sometimes antibiotics are needed. Follow-up care is a key part of your treatment and safety. Be sure to make and go to all appointments, and call your doctor if you are having problems. It's also a good idea to know your test results and keep a list of the medicines you take. How can you care for yourself at home? · Take an over-the-counter pain medicine, such as acetaminophen (Tylenol), ibuprofen (Advil, Motrin), or naproxen (Aleve). Read and follow all instructions on the label. · If the doctor prescribed antibiotics, take them as directed. Do not stop taking them just because you feel better. You need to take the full course of antibiotics. · Be careful when taking over-the-counter cold or flu medicines and Tylenol at the same time. Many of these medicines have acetaminophen, which is Tylenol. Read the labels to make sure that you are not taking more than the recommended dose. Too much acetaminophen (Tylenol) can be harmful. · Breathe warm, moist air from a steamy shower, a hot bath, or a sink filled with hot water. Avoid cold, dry air. Using a humidifier in your home may help. Follow the directions for cleaning the machine. · Use saline (saltwater) nasal washes to help keep your nasal passages open and wash out mucus and bacteria. You can buy saline nose drops at a grocery store or drugstore. Or you can make your own at home by adding 1 teaspoon of salt and 1 teaspoon of baking soda to 2 cups of distilled water. If you make your own, fill a bulb syringe with the solution, insert the tip into your nostril, and squeeze gently. [employer] your nose.   · Put a hot, wet towel or a warm gel pack on your face 3 or 4 times a day for 5 to 10 minutes each time. · Try a decongestant nasal spray like oxymetazoline (Afrin). Do not use it for more than 3 days in a row. Using it for more than 3 days can make your congestion worse. When should you call for help? Call your doctor now or seek immediate medical care if:    · You have new or worse swelling or redness in your face or around your eyes.     · You have a new or higher fever.    Watch closely for changes in your health, and be sure to contact your doctor if:    · You have new or worse facial pain.     · The mucus from your nose becomes thicker (like pus) or has new blood in it.     · You are not getting better as expected. Where can you learn more? Go to http://katarina-patricia.info/. Enter C709 in the search box to learn more about \"Sinusitis: Care Instructions. \"  Current as of: October 21, 2018  Content Version: 12.1  © 9416-0686 [employer]. Care instructions adapted under license by [employer] (which disclaims liability or warranty for this information). If you have questions about a medical condition or this instruction, always ask your healthcare professional. [place] [address] any warranty or liability for your use of this information.

## 2020-02-09 ENCOUNTER — HOSPITAL ENCOUNTER (OUTPATIENT)
Dept: MRI IMAGING | Age: 35
Discharge: HOME | End: 2020-02-09
Attending: PHYSICIAN ASSISTANT
Payer: COMMERCIAL

## 2020-02-09 ENCOUNTER — HOSPITAL ENCOUNTER (OUTPATIENT)
Dept: GENERAL RADIOLOGY | Age: 35
Discharge: HOME | End: 2020-02-09
Attending: PHYSICIAN ASSISTANT
Payer: COMMERCIAL

## 2020-02-09 DIAGNOSIS — R10.30 LOWER ABDOMINAL PAIN: ICD-10-CM

## 2020-02-09 DIAGNOSIS — R19.7 DIARRHEA: ICD-10-CM

## 2020-02-09 PROCEDURE — 74018 RADEX ABDOMEN 1 VIEW: CPT

## 2020-02-09 PROCEDURE — A9585 GADOBUTROL INJECTION: HCPCS

## 2020-02-09 PROCEDURE — 74183 MRI ABD W/O CNTR FLWD CNTR: CPT

## 2020-02-09 PROCEDURE — 74011250636 HC RX REV CODE- 250/636

## 2020-03-20 ENCOUNTER — OFFICE VISIT (OUTPATIENT)
Dept: HEMATOLOGY | Age: 35
End: 2020-03-20

## 2020-03-20 VITALS
TEMPERATURE: 98 F | OXYGEN SATURATION: 98 % | SYSTOLIC BLOOD PRESSURE: 106 MMHG | HEIGHT: 64 IN | HEART RATE: 69 BPM | WEIGHT: 129 LBS | BODY MASS INDEX: 22.02 KG/M2 | DIASTOLIC BLOOD PRESSURE: 80 MMHG

## 2020-03-20 DIAGNOSIS — R16.0 LIVER MASS: Primary | ICD-10-CM

## 2020-03-20 PROBLEM — K58.9 IBS (IRRITABLE BOWEL SYNDROME): Status: ACTIVE | Noted: 2020-03-20

## 2020-03-20 RX ORDER — FOLIC ACID 1 MG/1
TABLET ORAL DAILY
COMMUNITY
End: 2022-10-21

## 2020-03-20 RX ORDER — LANOLIN ALCOHOL/MO/W.PET/CERES
1000 CREAM (GRAM) TOPICAL DAILY
COMMUNITY

## 2020-03-20 NOTE — PROGRESS NOTES
[facility], MD, Juju Gonzalez MD [facility], LOPEZ Castillo, [facility]     Juli Adams, [facility]   TERRI Rodriges-SVEN Curtis, [facility]       Jean-Pierre Zapata [address]    at [address], [address] Fabiola Hernandez  22.    [phone]    FAX: [address] [place] [address], [address]    [phone]    FAX: [phone]       Patient Care Team:  Shannan Coon MD as PCP - General (Internal Medicine)  Shannan Coon MD as PCP - [facility]  Rosemarie Yang MD (Family Practice)      Problem List  Date Reviewed: 11/13/2018          Codes Class Noted    Liver mass ICD-10-CM: R16.0  ICD-9-CM: 573.8  3/20/2020        IBS (irritable bowel syndrome) ICD-10-CM: K58.9  ICD-9-CM: 564.1  3/20/2020              The clinicians listed above have asked me to see Noelle Mosqueda in consultation regarding a liver mass. All medical records sent by the referring physicians were reviewed including imaging studies     The patient is a 27 y.o.  female without any history of previous liver disease. The patient underwent a MRI for evaluation of non-specific abdominal pain in 2/2020. This demonstrated a single mass measuring 3.2 x 3.5 cm in the right lobe, segment 8    There was a non-contrast CT scan performed in 11/2028 that did not show a liver mass. The patient had been taking estrogen and/or progesterone for birth control since 2000. These medications were stopped 2/2020. Imaging studies of the liver suggest a normal liver     The patient has no symptoms which can be attributed to the liver disorder.     The patient is not currently experiencing the following symptoms of liver disease:  fatigue, pain in the right side over the liver,     The patient completes all daily activities without any functional limitations. ASSESSMENT AND PLAN:  Liver mass   This is most likely to be benign and either Focal Nodular Hyperplasia or hepatic adenoma. The mass was not seen on the Ct scan in 11/2018 because no contrast was utilized. In retrospect once the lesion was seen on MRI a faint lesion can be seen on the CT. She is now off [place]. If this is adenoma it is likely to reduce in size. Even if it does not it is below the thereshold of 5 cm where the risk of [address] is increased. Will repeat MRI in 6 months with [facility].      The patient is asymptomatic     Laboratory studies and imaging suggest the patient has no evidence of liver disease. Will measure the following serologic cancer markers AFP, CA 19-9, CEA. All were negative      Serologic testing for causes of chronic liver disease were ordered. Results were negative for HBV, HCV    Treatment of other medical problems in patients with chronic liver disease  There are no contraindications for the patient to take most medications that are necessary for treatment of other medical issues. Counseling for alcohol in patients with chronic liver disease  The patient was counseled regarding alcohol consumption and the effect of alcohol on chronic liver disease. The patient does not consume any significant amount of alcohol. Vaccinations   Since the patient does not have a chronic liver disease which can lead to liver injury screening for HAV and HBV is not needed. Routine vaccinations against other bacterial and viral agents can be performed as indicated. Annual flu vaccination should be administered if indicated.       ALLERGIES  Allergies   Allergen Reactions    Codeine Nausea and Vomiting       MEDICATIONS  Current Outpatient Medications   Medication Sig    linaCLOtide (Linzess) 72 mcg cap capsule Take  by mouth Daily (before breakfast).  cyanocobalamin 1,000 mcg tablet Take 1,000 mcg by mouth daily.  folic acid (FOLVITE) 1 mg tablet Take  by mouth daily.  Cetirizine (ZYRTEC) 10 mg cap Take 10 mg by mouth daily.  fluticasone (FLONASE) 50 mcg/actuation nasal spray 2 Sprays by Both Nostrils route daily. No current facility-administered medications for this visit. SYSTEM REVIEW NOT RELATED TO LIVER DISEASE OR REVIEWED ABOVE:  Constitution systems: Negative for fever, chills, weight gain, weight loss. Eyes: Negative for visual changes. ENT: Negative for sore throat, painful swallowing. Respiratory: Negative for cough, hemoptysis, SOB. Cardiology: Negative for chest pain, palpitations. GI:  Negative for constipation or diarrhea. : Negative for urinary frequency, dysuria, hematuria, nocturia. Skin: Negative for rash. Hematology: Negative for easy bruising, blood clots. Musculo-skelatal: Negative for back pain, muscle pain, weakness. Neurologic: Negative for headaches, dizziness, vertigo, memory problems not related to HE. Psychology: Negative for anxiety, depression. FAMILY HISTORY:  The father Has/had the following following chronic disease(s): HTN. The mother Has/had the following chronic disease(s): None. There is no family history of liver disease. SOCIAL HISTORY:  The patient is . The patient has 2 children,   The patient has never used tobacco products. The patient consumes alcohol on social occasions never in excess. The patient currently works full time as school nurse. PHYSICAL EXAMINATION:  Visit Vitals  /80 (BP 1 Location: Left arm, BP Patient Position: Sitting)   Pulse 69   Temp 98 °F (36.7 °C) (Tympanic)   Ht 5' 4\" (1.626 m)   Wt 129 lb (58.5 kg)   SpO2 98%   BMI 22.14 kg/m²     General: No acute distress. Eyes: Sclera anicteric. ENT: No oral lesions. Thyroid normal.  Nodes: No adenopathy. Skin: No spider angiomata. No jaundice. No palmar erythema. Respiratory: Lungs clear to auscultation. Cardiovascular: Regular heart rate. No murmurs. No JVD. Abdomen: Soft non-tender. Liver size normal to percussion/palpation. Spleen not palpable. No obvious ascites. Extremities: No edema. No muscle wasting. No gross arthritic changes. Neurologic: Alert and oriented. Cranial nerves grossly intact. No asterixis. LABORATORY STUDIES:  Liver [place] of [address] Units 11/13/2018   WBC 3.6 - 11.0 K/uL 7.4   HGB 11.5 - 16.0 g/dL 12.1    - 400 K/uL 256   AST 15 - 37 U/L 14 (L)   ALT 12 - 78 U/L 24   Alk Phos 45 - 117 U/L 45   Bili, Total 0.2 - 1.0 MG/DL 0.4   Albumin 3.5 - 5.0 g/dL 4.0   BUN 6 - 20 MG/DL 10   Creat 0.55 - 1.02 MG/DL 0.63   Na 136 - 145 mmol/L 138   K 3.5 - 5.1 mmol/L 4.0   Cl 97 - 108 mmol/L 104   CO2 21 - 32 mmol/L 27   Glucose 65 - 100 mg/dL 88     Cancer Screening Latest Ref Rng & Units 3/20/2020   AFP, Serum 0.0 - 8.0 ng/mL 1.1   AFP-L3% 0.0 - 9.9 % Comment   CA 19-9 0 - 35 U/mL 9   CEA 0.0 - 4.7 ng/mL 0.4     SEROLOGIES:  Serologies Latest Ref Rng & Units 3/20/2020   Hep B Surface Ag Negative Negative   Hep C Ab 0.0 - 0.9 s/co ratio <0.1     LIVER HISTOLOGY:  Not available or performed    ENDOSCOPIC PROCEDURES:  Not available or performed    RADIOLOGY:  11/2018. CT scan abdomen without IV contrast.  Normal appearing liver. No liver mass lesions. Normal spleen. No ascites. 2/2020. Dynamic MRI of liver. Normal appearing liver. Enhancing liver mass with washout and no rim enhancement on delayed images in the right lobe, segment 8measuring 3.2 x 3.5 cm. No central scar. Most consistent with adenoma or focal nodular hyperplasia. OTHER TESTING:  Not available or performed    FOLLOW-UP:  All of the issues listed above in the Assessment and Plan were discussed with the patient. All questions were answered. The patient expressed a clear understanding of the above.     Irina Dang [facility] in 4 months which should be 1-2 weeks after the next imaging study.         Chaz Quiroz MD  [facility] [address], [address] [place] [address] Fabiola Bah  22.  [phone]  [address]

## 2020-03-20 NOTE — PROGRESS NOTES
Bekah Casanova is a 27 y.o. female  Chief Complaint   Patient presents with    New Patient     Hepatic Lesions         Visit Vitals  /80 (BP 1 Location: Left arm, BP Patient Position: Sitting)   Pulse 69   Temp 98 °F (36.7 °C) (Tympanic)   Ht 5' 4\" (1.626 m)   Wt 129 lb (58.5 kg)   SpO2 98%   BMI 22.14 kg/m²     3 most recent PHQ Screens 3/20/2020   Little interest or pleasure in doing things Not at all   Feeling down, depressed, irritable, or hopeless Not at all   Total Score PHQ 2 0     Learning Assessment 3/20/2020   PRIMARY LEARNER Patient   BARRIERS PRIMARY LEARNER NONE   CO-LEARNER CAREGIVER No   PRIMARY LANGUAGE ENGLISH   LEARNER PREFERENCE PRIMARY DEMONSTRATION   ANSWERED BY patient   RELATIONSHIP SELF     Abuse Screening Questionnaire 3/20/2020   Do you ever feel afraid of your partner? N   Are you in a relationship with someone who physically or mentally threatens you? N   Is it safe for you to go home?  Brandy Santos

## 2020-03-20 NOTE — Clinical Note
3/29/20 Patient: Marlo Torres YOB: 1985 Date of Visit: 3/20/2020 Jil Mitchell MD 
[facility] [address] VIA Facsimile: [phone] Dear Jil Mitchell MD, Thank you for referring Ms. Marlo Torres to [address] for evaluation. My notes for this consultation are attached. If you have questions, please do not hesitate to call me. I look forward to following your patient along with you. Sincerely, Oziel Dickson MD

## 2020-03-21 LAB
CANCER AG19-9 SERPL-ACNC: 9 U/ML (ref 0–35)
CEA SERPL-MCNC: 0.4 NG/ML (ref 0–4.7)
COMMENT, 144067: NORMAL
HBV SURFACE AG SERPL QL IA: NEGATIVE
HCV AB S/CO SERPL IA: <0.1 S/CO RATIO (ref 0–0.9)

## 2020-03-23 LAB
AFP L3 MFR SERPL: NORMAL % (ref 0–9.9)
AFP SERPL-MCNC: 1.1 NG/ML (ref 0–8)

## 2020-07-06 ENCOUNTER — HOSPITAL ENCOUNTER (OUTPATIENT)
Dept: MRI IMAGING | Age: 35
Discharge: HOME | End: 2020-07-06
Attending: INTERNAL MEDICINE

## 2020-07-06 ENCOUNTER — TELEPHONE (OUTPATIENT)
Dept: HEMATOLOGY | Age: 35
End: 2020-07-06

## 2020-07-06 DIAGNOSIS — R16.0 LIVER MASS: ICD-10-CM

## 2020-07-06 NOTE — TELEPHONE ENCOUNTER
Patient states she was unable to get MRI today due to \"something with the contrast.\"  She needed a different kind she thinks? She said you spoke with the radiologist about it?     Maybe change to [facility]?

## 2020-07-16 ENCOUNTER — HOSPITAL ENCOUNTER (OUTPATIENT)
Dept: MRI IMAGING | Age: 35
Discharge: HOME | End: 2020-07-16
Attending: INTERNAL MEDICINE
Payer: COMMERCIAL

## 2020-07-16 PROCEDURE — A9581 GADOXETATE DISODIUM INJ: HCPCS | Performed by: INTERNAL MEDICINE

## 2020-07-16 PROCEDURE — 77030021566 MRI ABD W WO CONT

## 2020-07-16 PROCEDURE — 74011250636 HC RX REV CODE- 250/636: Performed by: INTERNAL MEDICINE

## 2020-07-16 PROCEDURE — 74011000258 HC RX REV CODE- 258: Performed by: INTERNAL MEDICINE

## 2020-07-16 RX ORDER — SODIUM CHLORIDE 0.9 % (FLUSH) 0.9 %
10 SYRINGE (ML) INJECTION
Status: COMPLETED | OUTPATIENT
Start: 2020-07-16 | End: 2020-07-16

## 2020-07-16 RX ADMIN — GADOXETATE DISODIUM 6 ML: 181.43 INJECTION, SOLUTION INTRAVENOUS at 10:23

## 2020-07-16 RX ADMIN — SODIUM CHLORIDE 100 ML: 900 INJECTION, SOLUTION INTRAVENOUS at 10:24

## 2020-07-16 RX ADMIN — Medication 10 ML: at 10:24

## 2020-07-28 ENCOUNTER — OFFICE VISIT (OUTPATIENT)
Dept: HEMATOLOGY | Age: 35
End: 2020-07-28

## 2020-07-28 VITALS
RESPIRATION RATE: 16 BRPM | SYSTOLIC BLOOD PRESSURE: 120 MMHG | HEIGHT: 64 IN | TEMPERATURE: 98.1 F | BODY MASS INDEX: 22.84 KG/M2 | HEART RATE: 74 BPM | DIASTOLIC BLOOD PRESSURE: 86 MMHG | WEIGHT: 133.8 LBS | OXYGEN SATURATION: 98 %

## 2020-07-28 DIAGNOSIS — R10.84 GENERALIZED ABDOMINAL PAIN: Primary | ICD-10-CM

## 2020-07-28 NOTE — PROGRESS NOTES
1201 Idania Bae MD, Wendy Zurita MD Elnita Clever, LOPEZ Willson, [facility]     Juli Adams, [facility]   PORSCHE Varghese-SVEN Bruno, [facility]       Jean-Pierre Zapata [facility] 136    at [facility] AT [address], [facility] Fabiola Hernandez  22.    [phone]    FAX: [address] [place] [address] Drive, [address], [address]    [phone]    FAX: [phone]       Patient Care Team:  Oumou Ferguson MD as PCP - General (Internal Medicine)  Oumou Ferguson MD as PCP - [facility]  Jael Ortiz MD (Family Medicine)      Problem List  Date Reviewed: 11/13/2018          Codes Class Noted    Focal nodular hyperplasia of liver ICD-10-CM: K76.89  ICD-9-CM: 573.8  3/20/2020        IBS (irritable bowel syndrome) ICD-10-CM: K58.9  ICD-9-CM: 564.1  3/20/2020              Vipul Woods is being seen at [address] for management of a liver mass. The active problem list, all pertinent past medical history, medications, radiologic findings and laboratory findings related to the liver disorder were reviewed with the patient. The patient is a 27 y.o.  female without any history of previous liver disease. The patient underwent a MRI for evaluation of non-specific abdominal pain in 2/2020. This demonstrated a single mass measuring 3.2 x 3.5 cm in the right lobe, segment 8    There was a non-contrast CT scan performed in 11/2018 that did not show a liver mass. The most recent imaging of the liver was MRI performed in 7/2020. Results suggest that the liver is normal.  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 The patient had been taking estrogen and/or progesterone for birth control since 2000. These medications were stopped 2/2020. Imaging studies of the liver suggest a normal liver     The patient has no symptoms which can be attributed to the liver disorder. The patient is not currently experiencing the following symptoms of liver disease:  fatigue, pain in the right side over the liver,     The patient completes all daily activities without any functional limitations. 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 Focal Nodular Hyperplasia are benign and in general do not increase in size over time. These lesions are not responsive to estrogens or progesterones. HRT or [facility] do not need to be stopped if the patient is taking these medications or can be started. Repeated imaging to monitor the size of these lesions is not necessary now that the diagnosis has been confirmed. She can go back [facility] if she wishes. Will repeat MRI in 6 months to make sure the [address] is not increasing in size once back on [facility]. Will repeat this MRI with [facility].      The patient is asymptomatic     Laboratory studies and imaging suggest the patient has no evidence of liver disease. Liver cancer markers are all negative. Serologic testing for causes of chronic liver disease were negative. Abdominal pain  She has been told she has AIP. It is not clear if the delta ALA and PBG confirmed this. Will obtain 24 hour urine    Treatment of other medical problems in patients with chronic liver disease  There are no contraindications for the patient to take most medications that are necessary for treatment of other medical issues. Counseling for alcohol in patients with chronic liver disease  The patient was counseled regarding alcohol consumption and the effect of alcohol on chronic liver disease. The patient does not consume any significant amount of alcohol.     Vaccinations   Since the patient does not have a chronic liver disease which can lead to liver injury screening for HAV and HBV is not needed. Routine vaccinations against other bacterial and viral agents can be performed as indicated. Annual flu vaccination should be administered if indicated. ALLERGIES  Allergies   Allergen Reactions    Codeine Nausea and Vomiting       MEDICATIONS  Current Outpatient Medications   Medication Sig    linaCLOtide (Linzess) 72 mcg cap capsule Take  by mouth Daily (before breakfast).  cyanocobalamin 1,000 mcg tablet Take 1,000 mcg by mouth daily.  folic acid (FOLVITE) 1 mg tablet Take  by mouth daily.  Cetirizine (ZYRTEC) 10 mg cap Take 10 mg by mouth daily.  fluticasone (FLONASE) 50 mcg/actuation nasal spray 2 Sprays by Both Nostrils route daily. No current facility-administered medications for this visit. SYSTEM REVIEW NOT RELATED TO LIVER DISEASE OR REVIEWED ABOVE:  Constitution systems: Negative for fever, chills, weight gain, weight loss. Eyes: Negative for visual changes. ENT: Negative for sore throat, painful swallowing. Respiratory: Negative for cough, hemoptysis, SOB. Cardiology: Negative for chest pain, palpitations. GI:  Negative for constipation or diarrhea. : Negative for urinary frequency, dysuria, hematuria, nocturia. Skin: Negative for rash. Hematology: Negative for easy bruising, blood clots. Musculo-skelatal: Negative for back pain, muscle pain, weakness. Neurologic: Negative for headaches, dizziness, vertigo, memory problems not related to HE. Psychology: Negative for anxiety, depression. FAMILY HISTORY:  The father Has/had the following following chronic disease(s): HTN. The mother Has/had the following chronic disease(s): None. There is no family history of liver disease. SOCIAL HISTORY:  The patient is . The patient has 2 children,   The patient has never used tobacco products. The patient consumes alcohol on social occasions never in excess.     The patient currently works full time as school nurse. PHYSICAL EXAMINATION:  Visit Vitals  /86 (BP 1 Location: Right arm, BP Patient Position: Sitting)   Pulse 74   Temp 98.1 °F (36.7 °C) (Oral)   Resp 16   Ht 5' 4\" (1.626 m)   Wt 133 lb 12.8 oz (60.7 kg)   SpO2 98%   BMI 22.97 kg/m²     General: No acute distress. Eyes: Sclera anicteric. ENT: No oral lesions. Thyroid normal.  Nodes: No adenopathy. Skin: No spider angiomata. No jaundice. No palmar erythema. Respiratory: Lungs clear to auscultation. Cardiovascular: Regular heart rate. No murmurs. No JVD. Abdomen: Soft non-tender. Liver size normal to percussion/palpation. Spleen not palpable. No obvious ascites. Extremities: No edema. No muscle wasting. No gross arthritic changes. Neurologic: Alert and oriented. Cranial nerves grossly intact. No asterixis. LABORATORY STUDIES:  Liver [place] of [address] Units 11/13/2018   WBC 3.6 - 11.0 K/uL 7.4   HGB 11.5 - 16.0 g/dL 12.1    - 400 K/uL 256   AST 15 - 37 U/L 14 (L)   ALT 12 - 78 U/L 24   Alk Phos 45 - 117 U/L 45   Bili, Total 0.2 - 1.0 MG/DL 0.4   Albumin 3.5 - 5.0 g/dL 4.0   BUN 6 - 20 MG/DL 10   Creat 0.55 - 1.02 MG/DL 0.63   Na 136 - 145 mmol/L 138   K 3.5 - 5.1 mmol/L 4.0   Cl 97 - 108 mmol/L 104   CO2 21 - 32 mmol/L 27   Glucose 65 - 100 mg/dL 88     Cancer Screening Latest Ref Rng & Units 3/20/2020   AFP, Serum 0.0 - 8.0 ng/mL 1.1   AFP-L3% 0.0 - 9.9 % Comment   CA 19-9 0 - 35 U/mL 9   CEA 0.0 - 4.7 ng/mL 0.4     SEROLOGIES:  Serologies Latest Ref Rng & Units 3/20/2020   Hep B Surface Ag Negative Negative   Hep C Ab 0.0 - 0.9 s/co ratio <0.1     LIVER HISTOLOGY:  Not available or performed    ENDOSCOPIC PROCEDURES:  Not available or performed    RADIOLOGY:  11/2018. CT scan abdomen without IV contrast.  Normal appearing liver. No liver mass lesions. Normal spleen. No ascites. 2/2020. Dynamic MRI of liver. Normal appearing liver.   Enhancing liver mass with washout and no rim enhancement on delayed images in the right lobe, segment 8measuring 3.2 x 3.5 cm. No central scar. Most consistent with adenoma or focal nodular hyperplasia. 7/2020. Dynamic MRI of liver. Normal appearing liver. Enhancing liver mass with washout and no rim enhancement on delayed images in the right lobe segment 8 measuring 3.6 x 3.1 cm. No central scar but most consistent with focal nodular hyperplasia. OTHER TESTING:  Not available or performed    FOLLOW-UP:  All of the issues listed above in the Assessment and Plan were discussed with the patient. All questions were answered. The patient expressed a clear understanding of the above. [facility] 87 in 6 months which should be 1-2 weeks after the next imaging study.         Elden Cranker, MD [facility] of [address] [place] [address] A, [address] [place] [address] 22.  [phone]  [address] [place] [address]

## 2020-07-28 NOTE — PROGRESS NOTES
Chief Complaint   Patient presents with    Mass     Liver     3 most recent PHQ Screens 7/28/2020   Little interest or pleasure in doing things Not at all   Feeling down, depressed, irritable, or hopeless Not at all   Total Score PHQ 2 0     Abuse Screening Questionnaire 7/28/2020   Do you ever feel afraid of your partner? N   Are you in a relationship with someone who physically or mentally threatens you? N   Is it safe for you to go home? Y     Visit Vitals  /86 (BP 1 Location: Right arm, BP Patient Position: Sitting)   Pulse 74   Temp 98.1 °F (36.7 °C) (Oral)   Resp 16   Ht 5' 4\" (1.626 m)   Wt 133 lb 12.8 oz (60.7 kg)   SpO2 98%   BMI 22.97 kg/m²   1. Have you been to the ER, urgent care clinic since your last visit? Hospitalized since your last visit?no    2. Have you seen or consulted any other health care providers outside of the [address] [place] [address] since your last visit? Include any pap smears or colon screening.  Kristopher

## 2020-08-08 LAB
D-ALA 24H UR-MCNC: NORMAL MG/L
D-ALA 24H UR-MRATE: NORMAL MG/(24.H)
D-ALA UR-MCNC: NORMAL MG/L
PBG UR-MCNC: NORMAL MG/L

## 2020-08-15 PROBLEM — K76.89 FOCAL NODULAR HYPERPLASIA OF LIVER: Status: ACTIVE | Noted: 2020-03-20

## 2020-08-22 DIAGNOSIS — E80.20 PORPHYRIA, UNSPECIFIED PORPHYRIA TYPE (HCC): Primary | ICD-10-CM

## 2020-09-03 ENCOUNTER — HOSPITAL ENCOUNTER (OUTPATIENT)
Dept: NUCLEAR MEDICINE | Age: 35
Discharge: HOME | End: 2020-09-03
Attending: PHYSICIAN ASSISTANT
Payer: COMMERCIAL

## 2020-09-03 DIAGNOSIS — R19.7 DIARRHEA: ICD-10-CM

## 2020-09-03 DIAGNOSIS — R93.2 ABNORMAL FINDING ON IMAGING OF LIVER: ICD-10-CM

## 2020-09-03 DIAGNOSIS — R68.81 EARLY SATIETY: ICD-10-CM

## 2020-09-03 DIAGNOSIS — K59.04 CHRONIC IDIOPATHIC CONSTIPATION: ICD-10-CM

## 2020-09-03 DIAGNOSIS — R10.30 LOWER ABDOMINAL PAIN: ICD-10-CM

## 2020-09-03 PROCEDURE — 78264 GASTRIC EMPTYING IMG STUDY: CPT

## 2020-09-21 DIAGNOSIS — R10.84 ABDOMINAL PAIN, GENERALIZED: Primary | ICD-10-CM

## 2020-09-23 LAB
D-ALA 24H UR-MCNC: 1.4 MG/L
D-ALA 24H UR-MRATE: 2.8 MG/24 HR (ref 0.5–5.1)
PBG 24H UR-MCNC: 0.5 MG/L (ref 0–2)
PBG 24H UR-MRATE: 1 MG/24 HR (ref 0–1.5)
PBG UR-MCNC: 0.6 MG/L (ref 0–2)

## 2021-10-25 ENCOUNTER — OFFICE VISIT (OUTPATIENT)
Dept: URGENT CARE | Age: 36
End: 2021-10-25

## 2021-10-25 VITALS — TEMPERATURE: 98.1 F | OXYGEN SATURATION: 96 % | RESPIRATION RATE: 18 BRPM | HEART RATE: 77 BPM

## 2021-10-25 DIAGNOSIS — R09.89 TENDER LYMPH NODE: ICD-10-CM

## 2021-10-25 DIAGNOSIS — Z20.822 ENCOUNTER FOR LABORATORY TESTING FOR COVID-19 VIRUS: ICD-10-CM

## 2021-10-25 DIAGNOSIS — J02.9 SORE THROAT: Primary | ICD-10-CM

## 2021-10-25 LAB
S PYO AG THROAT QL: NEGATIVE
SARS-COV-2 POC: NEGATIVE
VALID INTERNAL CONTROL?: YES

## 2021-10-25 PROCEDURE — 87426 SARSCOV CORONAVIRUS AG IA: CPT | Performed by: NURSE PRACTITIONER

## 2021-10-25 PROCEDURE — 87880 STREP A ASSAY W/OPTIC: CPT | Performed by: NURSE PRACTITIONER

## 2021-10-25 PROCEDURE — 99212 OFFICE O/P EST SF 10 MIN: CPT | Performed by: NURSE PRACTITIONER

## 2021-10-25 RX ORDER — SPIRONOLACTONE 100 MG/1
100 TABLET, FILM COATED ORAL DAILY
COMMUNITY

## 2021-10-25 RX ORDER — ESCITALOPRAM OXALATE 10 MG/1
15 TABLET ORAL DAILY
COMMUNITY

## 2021-10-25 NOTE — PROGRESS NOTES
Subjective: (As above and below)       This patient was seen in Flu Clinic at [address] [place] [address] Urgent Care while outdoors at their vehicle due to COVID-19 pandemic with PPE and focused examination in order to decrease community viral transmission. The patient/guardian gave verbal consent to treat. Chief Complaint   Patient presents with    Cold Symptoms     sore throat, congestion x3 days, travelled 2 weeks ago     Arnold Ferguson is a 39 y.o. female who presents for evaluation of : sore throat. Onset 3 days ago. Is able to tolerate food and fluids without issue. No fever, chills, rashes or cough. 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 ROS negative for SOB  Review of Systems - negative except as listed above    Reviewed PmHx, RxHx, FmHx, SocHx, AllgHx and updated in chart. Family History   Problem Relation Age of Onset    Hypertension Father        Past Medical History:   Diagnosis Date    IBS (irritable bowel syndrome) 2019      Social History     Socioeconomic History    Marital status:      Spouse name: Not on file    Number of children: Not on file    Years of education: Not on file    Highest education level: Not on file   Tobacco Use    Smoking status: Never Smoker    Smokeless tobacco: Former User   Substance and Sexual Activity    Alcohol use: Yes     Comment: occ    Drug use: Not Currently     Social Determinants of Health     Financial Resource Strain:     Difficulty of Paying Living Expenses:    Food Insecurity:     Worried About Running Out of Food in the Last Year:     [address] in the Last Year:    Transportation Needs:     Lack of Transportation (Medical):      Lack of Transportation (Non-Medical):    Physical Activity:     Days of Exercise per Week:     Minutes of Exercise per Session:    Stress:     Feeling of Stress :    Social Connections:     Frequency of Communication with Friends and Family:     Frequency of Social Gatherings with Friends and Family:     Attends Judaism Services:     Active Member of Clubs or Organizations:     Attends Club or Organization Meetings:     Marital Status:           Current Outpatient Medications   Medication Sig    escitalopram oxalate (Lexapro) 10 mg tablet Take 15 mg by mouth daily.  spironolactone (ALDACTONE) 100 mg tablet Take 100 mg by mouth daily.  cyanocobalamin 1,000 mcg tablet Take 1,000 mcg by mouth daily.  folic acid (FOLVITE) 1 mg tablet Take  by mouth daily.  Cetirizine (ZYRTEC) 10 mg cap Take 10 mg by mouth daily.  fluticasone (FLONASE) 50 mcg/actuation nasal spray 2 Sprays by Both Nostrils route daily.  linaCLOtide (Linzess) 72 mcg cap capsule Take  by mouth Daily (before breakfast). (Patient not taking: Reported on 10/25/2021)     No current facility-administered medications for this visit. Objective:     Vitals:    10/25/21 0830   Pulse: 77   Resp: 18   Temp: 98.1 °F (36.7 °C)   SpO2: 96%       Physical Exam   General appearance - appears well hydrated and does not appear toxic, no acute distress  Eyes - EOMs intact. Non injected. No scleral icterus   Ears - no external swelling  Nose - nasal . No purulent drainage  Mouth - OP clear without swelling, exudate or lesion. Mucus membranes moist. Uvula midline. Neck/Lymphatics - trachea midline, full AROM  Chest - Normal breathing effort no wheeze rales, rhonchi or diminishments bilaterally. Heart - RRR, no murmurs  Skin - no observable rashes or pallor  Neurologic- alert and oriented x 3  Psychiatric- normal mood, behavior and though content. Assessment/ Plan:     1. Sore throat    - AMB POC RAPID STREP A  - AMB POC SARS-COV-2    2. Tender lymph node    - AMB POC RAPID STREP A  - AMB POC SARS-COV-2    3.  Encounter for laboratory testing for COVID-19 virus    - AMB POC RAPID STREP A  - AMB POC SARS-COV-2    Rapid covid and rapid strep are both negative today  No evidence suggesting complication of illness at this time. Will discharge home with close monitoring and follow up. Supportive home care for mild symptom- maintain adequate fluid intake, over the counter Tylenol (for fever, aches, pains, chills), deep breathing exercises        Test Results:  Recent Results (from the past 6 hour(s))   AMB POC RAPID STREP A    Collection Time: 10/25/21  8:47 AM   Result Value Ref Range    VALID INTERNAL CONTROL POC Yes     Group A Strep Ag Negative Negative   AMB POC SARS-COV-2    Collection Time: 10/25/21  8:48 AM   Result Value Ref Range    SARS-COV-2 POC Negative Negative       Follow up: We have reviewed worsening/concerning signs and symptoms that may warrant seeking immediate care in the ED setting. For other non-severe changes, non-improvement or questions, patient aware to contact PCP office or consider a virtual online medical consultation.         Suze Merlos NP

## 2021-10-31 ENCOUNTER — OFFICE VISIT (OUTPATIENT)
Dept: URGENT CARE | Age: 36
End: 2021-10-31

## 2021-10-31 VITALS — TEMPERATURE: 98.2 F | OXYGEN SATURATION: 97 % | HEART RATE: 87 BPM | RESPIRATION RATE: 17 BRPM

## 2021-10-31 DIAGNOSIS — H69.82 EUSTACHIAN TUBE DYSFUNCTION, LEFT: ICD-10-CM

## 2021-10-31 DIAGNOSIS — J02.9 PHARYNGITIS, UNSPECIFIED ETIOLOGY: Primary | ICD-10-CM

## 2021-10-31 LAB
S PYO AG THROAT QL: NEGATIVE
VALID INTERNAL CONTROL?: YES

## 2021-10-31 PROCEDURE — 87880 STREP A ASSAY W/OPTIC: CPT | Performed by: FAMILY MEDICINE

## 2021-10-31 PROCEDURE — 99213 OFFICE O/P EST LOW 20 MIN: CPT | Performed by: FAMILY MEDICINE

## 2021-10-31 RX ORDER — AMOXICILLIN 875 MG/1
875 TABLET, FILM COATED ORAL 2 TIMES DAILY
Qty: 20 TABLET | Refills: 0 | Status: SHIPPED | OUTPATIENT
Start: 2021-10-31 | End: 2021-11-10

## 2021-10-31 RX ORDER — METHYLPREDNISOLONE 4 MG/1
TABLET ORAL
Qty: 1 DOSE PACK | Refills: 0 | Status: SHIPPED | OUTPATIENT
Start: 2021-10-31 | End: 2022-04-07

## 2021-10-31 NOTE — PROGRESS NOTES
This patient was seen at [address] [place] [address] Urgent Care while in their vehicle due to COVID-19 pandemic with PPE and focused examination in order to decrease community viral transmission. The patient/guardian gave verbal consent to treat. The history is provided by the patient. Sore Throat   The history is provided by the patient. This is a recurrent problem. The current episode started more than 1 week ago. The problem has not changed since onset. There has been no fever. Associated symptoms include congestion and ear pain. She has had no exposure to strep or mono. She has tried NSAIDs and acetaminophen for the symptoms. Past Medical History:   Diagnosis Date    IBS (irritable bowel syndrome) 2019        Past Surgical History:   Procedure Laterality Date    HX GYN           Family History   Problem Relation Age of Onset    Hypertension Father         Social History     Socioeconomic History    Marital status:      Spouse name: Not on file    Number of children: Not on file    Years of education: Not on file    Highest education level: Not on file   Occupational History    Not on file   Tobacco Use    Smoking status: Never Smoker    Smokeless tobacco: Former User   Vaping Use    Vaping Use: Never used   Substance and Sexual Activity    Alcohol use: Yes     Comment: occ    Drug use: Not Currently    Sexual activity: Not on file   Other Topics Concern    Not on file   Social History Narrative    Not on file     Social Determinants of Health     Financial Resource Strain:     Difficulty of Paying Living Expenses:    Food Insecurity:     Worried About [address] in the Last Year:     [address] in the Last Year:    Transportation Needs:     Lack of Transportation (Medical):      Lack of Transportation (Non-Medical):    Physical Activity:     Days of Exercise per Week:     Minutes of Exercise per Session:    Stress:     Feeling of Stress :    Social Connections:  Frequency of Communication with Friends and Family:     Frequency of Social Gatherings with Friends and Family:     Attends Yazidism Services:     Active Member of Clubs or Organizations:     Attends Club or Organization Meetings:     Marital Status:    Intimate Partner Violence:     Fear of Current or Ex-Partner:     Emotionally Abused:     Physically Abused:     Sexually Abused: ALLERGIES: Codeine    Review of Systems   Constitutional: Negative for chills and fever. HENT: Positive for congestion, ear pain and sore throat. All other systems reviewed and are negative. Vitals:    10/31/21 0902   Pulse: 87   Resp: 17   Temp: 98.2 °F (36.8 °C)   SpO2: 97%       Physical Exam  Vitals and nursing note reviewed. Constitutional:       General: She is not in acute distress. Appearance: She is not ill-appearing. HENT:      Right Ear: Tympanic membrane normal.      Left Ear: A middle ear effusion is present. Nose: No congestion or rhinorrhea. Mouth/Throat:      Pharynx: Posterior oropharyngeal erythema present. No oropharyngeal exudate. Pulmonary:      Effort: Pulmonary effort is normal. No respiratory distress. Breath sounds: No wheezing or rhonchi. MDM    Procedures        ICD-10-CM ICD-9-CM    1. Pharyngitis, unspecified etiology  J02.9 462 AMB POC RAPID STREP A   2. Eustachian tube dysfunction, left  H69.82 381.81      Medications Ordered Today   Medications    amoxicillin (AMOXIL) 875 mg tablet     Sig: Take 1 Tablet by mouth two (2) times a day for 10 days.      Dispense:  20 Tablet     Refill:  0    methylPREDNISolone (Medrol, Kyler,) 4 mg tablet     Sig: As directed     Dispense:  1 Dose Pack     Refill:  0     Results for orders placed or performed in visit on 10/31/21   AMB POC RAPID STREP A   Result Value Ref Range    VALID INTERNAL CONTROL POC Yes     Group A Strep Ag Negative Negative     The patients condition was discussed with the patient and they understand. The patient is to follow up with primary care doctor. If signs and symptoms become worse the pt is to go to the ER. The patient is to take medications as prescribed.

## 2021-10-31 NOTE — PATIENT INSTRUCTIONS
Middle Ear Fluid: Care Instructions  Your Care Instructions     Fluid often builds up inside the ear during a cold or allergies. Usually the fluid drains away, but sometimes a small tube in the ear, called the eustachian tube, stays blocked for months. Symptoms of fluid buildup may include:  · Popping, ringing, or a feeling of fullness or pressure in the ear. · Trouble hearing. · Balance problems and dizziness. In most cases, you can treat yourself at home. Follow-up care is a key part of your treatment and safety. Be sure to make and go to all appointments, and call your doctor if you are having problems. It's also a good idea to know your test results and keep a list of the medicines you take. How can you care for yourself at home? · In most cases, the fluid clears up within a few months without treatment. You may need more tests if the fluid does not clear up after 3 months. · If your doctor prescribed antibiotics, take them as directed. Do not stop taking them just because you feel better. You need to take the full course of antibiotics. When should you call for help? Call your doctor now or seek immediate medical care if:    · You have symptoms of infection, such as:  ? Increased pain, swelling, warmth, or redness. ? Pus draining from the area. ? A fever. Watch closely for changes in your health, and be sure to contact your doctor if:    · You notice changes in hearing.     · You do not get better as expected. Where can you learn more? Go to http://www.gray.com/  Enter U486 in the search box to learn more about \"Middle Ear Fluid: Care Instructions. \"  Current as of: December 2, 2020               Content Version: 13.0  © 9538-8080 [employer]. Care instructions adapted under license by [employer] (which disclaims liability or warranty for this information).  If you have questions about a medical condition or this instruction, always ask your healthcare professional. [place] [address] any warranty or liability for your use of this information.

## 2022-03-18 PROBLEM — K58.9 IBS (IRRITABLE BOWEL SYNDROME): Status: ACTIVE | Noted: 2020-03-20

## 2022-03-19 PROBLEM — K76.89 FOCAL NODULAR HYPERPLASIA OF LIVER: Status: ACTIVE | Noted: 2020-03-20

## 2022-04-07 ENCOUNTER — OFFICE VISIT (OUTPATIENT)
Dept: URGENT CARE | Age: 37
End: 2022-04-07

## 2022-04-07 VITALS
DIASTOLIC BLOOD PRESSURE: 87 MMHG | BODY MASS INDEX: 22.66 KG/M2 | HEART RATE: 77 BPM | SYSTOLIC BLOOD PRESSURE: 121 MMHG | OXYGEN SATURATION: 97 % | WEIGHT: 132 LBS | TEMPERATURE: 98.1 F | RESPIRATION RATE: 16 BRPM

## 2022-04-07 DIAGNOSIS — J02.9 SORE THROAT: Primary | ICD-10-CM

## 2022-04-07 LAB
S PYO AG THROAT QL: NEGATIVE
VALID INTERNAL CONTROL?: YES

## 2022-04-07 PROCEDURE — 87880 STREP A ASSAY W/OPTIC: CPT | Performed by: FAMILY MEDICINE

## 2022-04-07 PROCEDURE — 99213 OFFICE O/P EST LOW 20 MIN: CPT | Performed by: FAMILY MEDICINE

## 2022-04-07 RX ORDER — NORGESTIMATE AND ETHINYL ESTRADIOL 0.25-0.035
1 KIT ORAL DAILY
COMMUNITY

## 2022-04-07 NOTE — PROGRESS NOTES
Yuli Recio is a 39 y.o. female who presents with ST, redness in the back of throat, pain with swallowing, slight left ear pain since yesterday. Denies cough, fever, body aches. The history is provided by the patient. Past Medical History:   Diagnosis Date    IBS (irritable bowel syndrome) 2019        Past Surgical History:   Procedure Laterality Date    HX GYN           Family History   Problem Relation Age of Onset    Hypertension Father         Social History     Socioeconomic History    Marital status:      Spouse name: Not on file    Number of children: Not on file    Years of education: Not on file    Highest education level: Not on file   Occupational History    Not on file   Tobacco Use    Smoking status: Never Smoker    Smokeless tobacco: Former User   Vaping Use    Vaping Use: Never used   Substance and Sexual Activity    Alcohol use: Yes     Comment: occ    Drug use: Not Currently    Sexual activity: Not on file   Other Topics Concern    Not on file   Social History Narrative    Not on file     Social Determinants of Health     Financial Resource Strain:     Difficulty of Paying Living Expenses: Not on file   Food Insecurity:     Worried About [address] in the Last Year: Not on file    [address] in the Last Year: Not on file   Transportation Needs:     Lack of Transportation (Medical): Not on file    Lack of Transportation (Non-Medical):  Not on file   Physical Activity:     Days of Exercise per Week: Not on file    Minutes of Exercise per Session: Not on file   Stress:     Feeling of Stress : Not on file   Social Connections:     Frequency of Communication with Friends and Family: Not on file    Frequency of Social Gatherings with Friends and Family: Not on file    Attends [employer] Services: Not on file    Active Member of Clubs or Organizations: Not on file    Attends Club or Organization Meetings: Not on file    Marital Status: Not on file   Intimate Partner Violence:     Fear of Current or Ex-Partner: Not on file    Emotionally Abused: Not on file    Physically Abused: Not on file    Sexually Abused: Not on file   Housing Stability:     Unable to Pay for Housing in the Last Year: Not on file    Number of [place] in the Last Year: Not on file    Unstable Housing in the Last Year: Not on file                ALLERGIES: Codeine    Review of Systems   Constitutional: Negative for activity change, appetite change and fever. HENT: Positive for ear pain and sore throat. Negative for congestion. Respiratory: Negative for cough and shortness of breath. Gastrointestinal: Negative for diarrhea, nausea and vomiting. Vitals:    04/07/22 0903   BP: 121/87   Pulse: 77   Resp: 16   Temp: 98.1 °F (36.7 °C)   SpO2: 97%   Weight: 132 lb (59.9 kg)       Physical Exam  Vitals and nursing note reviewed. Constitutional:       General: She is not in acute distress. Appearance: She is well-developed. She is not diaphoretic. HENT:      Right Ear: Tympanic membrane and ear canal normal.      Left Ear: Tympanic membrane and ear canal normal.      Nose: No congestion. Mouth/Throat:      Mouth: Mucous membranes are moist.      Pharynx: Oropharynx is clear. Posterior oropharyngeal erythema present. No oropharyngeal exudate. Cardiovascular:      Rate and Rhythm: Normal rate and regular rhythm. Heart sounds: Normal heart sounds. Pulmonary:      Effort: Pulmonary effort is normal. No respiratory distress. Breath sounds: Normal breath sounds. No stridor. No wheezing, rhonchi or rales. Lymphadenopathy:      Cervical: No cervical adenopathy. Neurological:      Mental Status: She is alert. Psychiatric:         Behavior: Behavior normal.         Thought Content: Thought content normal.         Judgment: Judgment normal.         [facility]    ICD-10-CM ICD-9-CM   1.  Sore throat  J02.9 462       Orders Placed This Encounter    UPPER RESPIRATORY CULTURE    AMB POC RAPID STREP A      Tylenol or ibuprofen prn  Salt water gargles  Await throat cx results      Results for orders placed or performed in visit on 04/07/22   AMB POC RAPID STREP A   Result Value Ref Range    VALID INTERNAL CONTROL POC Yes     Group A Strep Ag Negative Negative     Procedures

## 2022-04-07 NOTE — PATIENT INSTRUCTIONS
Sore Throat: Care Instructions  Overview     Infection by bacteria or a virus causes most sore throats. Cigarette smoke, dry air, air pollution, allergies, and yelling can also cause a sore throat. Sore throats can be painful and annoying. Fortunately, most sore throats go away on their own. If you have a bacterial infection, your doctor may prescribe antibiotics. Follow-up care is a key part of your treatment and safety. Be sure to make and go to all appointments, and call your doctor if you are having problems. It's also a good idea to know your test results and keep a list of the medicines you take. How can you care for yourself at home? · If your doctor prescribed antibiotics, take them as directed. Do not stop taking them just because you feel better. You need to take the full course of antibiotics. · Gargle with warm salt water several times a day to help reduce swelling and relieve pain. Mix 1/2 teaspoon of salt in 1 cup of warm water. · Take an over-the-counter pain medicine, such as acetaminophen (Tylenol), ibuprofen (Advil, Motrin), or naproxen (Aleve). Read and follow all instructions on the label. · Be careful when taking over-the-counter cold or flu medicines and Tylenol at the same time. Many of these medicines have acetaminophen, which is Tylenol. Read the labels to make sure that you are not taking more than the recommended dose. Too much acetaminophen (Tylenol) can be harmful. · Drink plenty of fluids. Fluids may help soothe an irritated throat. Hot fluids, such as tea or soup, may help decrease throat pain. · Use over-the-counter throat lozenges to soothe pain. Regular cough drops or hard candy may also help. These should not be given to young children because of the risk of choking. · Do not smoke or allow others to smoke around you. If you need help quitting, talk to your doctor about stop-smoking programs and medicines. These can increase your chances of quitting for good.   · Use a vaporizer or humidifier to add moisture to your bedroom. Follow the directions for cleaning the machine. When should you call for help? Call your doctor now or seek immediate medical care if:    · You have trouble breathing.     · Your sore throat gets much worse on one side.     · You have new or worse trouble swallowing.     · You have a new or higher fever. Watch closely for changes in your health, and be sure to contact your doctor if you do not get better as expected. Where can you learn more? Go to http://www.gray.com/  Enter U420 in the search box to learn more about \"Sore Throat: Care Instructions. \"  Current as of: September 8, 2021               Content Version: 13.2  © 2006-2022 [employer]. Care instructions adapted under license by [employer] (which disclaims liability or warranty for this information). If you have questions about a medical condition or this instruction, always ask your healthcare professional. [place] [address] any warranty or liability for your use of this information.

## 2022-04-10 LAB — BACTERIA SPEC RESP CULT: NORMAL

## 2022-10-21 ENCOUNTER — OFFICE VISIT (OUTPATIENT)
Dept: URGENT CARE | Age: 37
End: 2022-10-21

## 2022-10-21 VITALS
RESPIRATION RATE: 16 BRPM | SYSTOLIC BLOOD PRESSURE: 120 MMHG | BODY MASS INDEX: 23.86 KG/M2 | TEMPERATURE: 97.8 F | HEART RATE: 83 BPM | DIASTOLIC BLOOD PRESSURE: 87 MMHG | WEIGHT: 139 LBS | OXYGEN SATURATION: 99 %

## 2022-10-21 DIAGNOSIS — J01.00 ACUTE NON-RECURRENT MAXILLARY SINUSITIS: Primary | ICD-10-CM

## 2022-10-21 PROCEDURE — 99213 OFFICE O/P EST LOW 20 MIN: CPT | Performed by: FAMILY MEDICINE

## 2022-10-21 RX ORDER — CEFDINIR 300 MG/1
300 CAPSULE ORAL 2 TIMES DAILY
Qty: 20 CAPSULE | Refills: 0 | Status: SHIPPED | OUTPATIENT
Start: 2022-10-21 | End: 2022-10-31

## 2022-10-21 RX ORDER — AMITRIPTYLINE HYDROCHLORIDE 10 MG/1
TABLET, FILM COATED ORAL
COMMUNITY
Start: 2022-09-30

## 2022-10-21 NOTE — PROGRESS NOTES
Bryanna Kennedy is a 44 y.o. female who presents with left ear pain x 3 days along with sinus congestion and greenish drainage with slight cough due to drainage. Had cold last week which seemed to resolve after a few days. Denies fever, SOB. Takes daily Zyrtec and Flonase. The history is provided by the patient. Past Medical History:   Diagnosis Date    IBS (irritable bowel syndrome) 2019        Past Surgical History:   Procedure Laterality Date    HX GYN           Family History   Problem Relation Age of Onset    Hypertension Father         Social History     Socioeconomic History    Marital status:      Spouse name: Not on file    Number of children: Not on file    Years of education: Not on file    Highest education level: Not on file   Occupational History    Not on file   Tobacco Use    Smoking status: Never    Smokeless tobacco: Former   Vaping Use    Vaping Use: Never used   Substance and Sexual Activity    Alcohol use: Yes     Comment: occ    Drug use: Not Currently    Sexual activity: Not on file   Other Topics Concern    Not on file   Social History Narrative    Not on file     Social Determinants of Health     Financial Resource Strain: Not on file   Food Insecurity: Not on file   Transportation Needs: Not on file   Physical Activity: Not on file   Stress: Not on file   Social Connections: Not on file   Intimate Partner Violence: Not on file   Housing Stability: Not on file                ALLERGIES: Codeine    Review of Systems   Constitutional:  Negative for activity change, appetite change and fever. HENT:  Positive for congestion, ear pain and sore throat. Negative for sinus pain. Respiratory:  Positive for cough. Negative for shortness of breath. Vitals:    10/21/22 0835   BP: 120/87   Pulse: 83   Resp: 16   Temp: 97.8 °F (36.6 °C)   SpO2: 99%   Weight: 139 lb (63 kg)       Physical Exam  Vitals and nursing note reviewed.    Constitutional:       General: She is not in acute distress. Appearance: She is well-developed. She is not diaphoretic. HENT:      Right Ear: Tympanic membrane, ear canal and external ear normal.      Left Ear: Ear canal and external ear normal. Tympanic membrane is bulging. Tympanic membrane is not erythematous. Nose: Congestion present. Right Sinus: No maxillary sinus tenderness or frontal sinus tenderness. Left Sinus: Maxillary sinus tenderness present. No frontal sinus tenderness. Mouth/Throat:      Pharynx: Posterior oropharyngeal erythema present. No oropharyngeal exudate. Tonsils: No tonsillar abscesses. Cardiovascular:      Rate and Rhythm: Normal rate and regular rhythm. Heart sounds: Normal heart sounds. Pulmonary:      Effort: Pulmonary effort is normal. No respiratory distress. Breath sounds: Normal breath sounds. No stridor. No wheezing, rhonchi or rales. Lymphadenopathy:      Cervical: Cervical adenopathy present. Neurological:      Mental Status: She is alert. Psychiatric:         Behavior: Behavior normal.         Thought Content: Thought content normal.         Judgment: Judgment normal.       [facility]    ICD-10-CM ICD-9-CM   1. Acute non-recurrent maxillary sinusitis  J01.00 461.0       Orders Placed This Encounter    cefdinir (OMNICEF) 300 mg capsule     Sig: Take 1 Capsule by mouth two (2) times a day for 10 days. Dispense:  20 Capsule     Refill:  0      Continue Zyrtec, Flonase  The patient is to follow up with PCP if no improvement. If signs and symptoms become worse the pt is to go to the ER.           Procedures

## 2023-02-15 ENCOUNTER — OFFICE VISIT (OUTPATIENT)
Dept: PRIMARY CARE CLINIC | Age: 38
End: 2023-02-15

## 2023-02-15 ENCOUNTER — HOSPITAL ENCOUNTER (EMERGENCY)
Age: 38
Discharge: LEFT BEFORE DISCHARGE | End: 2023-02-15

## 2023-02-15 VITALS
HEIGHT: 64 IN | BODY MASS INDEX: 24.07 KG/M2 | TEMPERATURE: 97.9 F | OXYGEN SATURATION: 98 % | HEART RATE: 81 BPM | WEIGHT: 141 LBS | RESPIRATION RATE: 16 BRPM | SYSTOLIC BLOOD PRESSURE: 160 MMHG | DIASTOLIC BLOOD PRESSURE: 99 MMHG

## 2023-02-15 DIAGNOSIS — R03.0 ELEVATED BLOOD PRESSURE READING: ICD-10-CM

## 2023-02-15 DIAGNOSIS — R10.31 RIGHT LOWER QUADRANT ABDOMINAL PAIN: Primary | ICD-10-CM

## 2023-02-15 LAB

## 2023-02-15 PROCEDURE — 81002 URINALYSIS NONAUTO W/O SCOPE: CPT | Performed by: NURSE PRACTITIONER

## 2023-02-15 PROCEDURE — 99203 OFFICE O/P NEW LOW 30 MIN: CPT | Performed by: NURSE PRACTITIONER

## 2023-02-15 PROCEDURE — 81025 URINE PREGNANCY TEST: CPT | Performed by: NURSE PRACTITIONER

## 2023-02-15 NOTE — PROGRESS NOTES
Chief Complaint   Patient presents with    Abdominal Pain     Right lower quadrant; radiates into back; throbbing/aching; started this morning; BM today - normal     HISTORY OF PRESENT ILLNESS  Jd Liriano is a 44 y.o. female. She reports sudden onset of RLQ pain radiating to back. Sudden onset 1100 today, was sitting at desk. She describes pain as consistent throbbing. No OTC. Rates 8/10. No changes in bowel or bladder. She denies fever, endorses some chills. She notes tubal ligation. Hx of chronic back pain. LMP 2/8/23. She notes no hx of HTN. Review of Systems   Constitutional:  Positive for chills. Gastrointestinal:  Positive for abdominal pain. Genitourinary: Negative. Musculoskeletal: Negative. Physical Exam  Vitals and nursing note reviewed. Cardiovascular:      Rate and Rhythm: Normal rate. Pulmonary:      Effort: Pulmonary effort is normal.   Abdominal:      General: Abdomen is flat. Bowel sounds are normal.      Palpations: Abdomen is soft. Tenderness: There is abdominal tenderness in the right lower quadrant. Negative signs include McBurney's sign. Musculoskeletal:      Cervical back: Neck supple. Neurological:      Mental Status: She is alert.      Past Medical History:   Diagnosis Date    IBS (irritable bowel syndrome) 2019     Past Surgical History:   Procedure Laterality Date    HX GYN       BP (!) 160/99 (BP 1 Location: Right arm, BP Patient Position: Sitting)   Pulse 81   Temp 97.9 °F (36.6 °C) (Temporal)   Resp 16   Ht 5' 4\" (1.626 m)   Wt 141 lb (64 kg)   SpO2 98%   BMI 24.20 kg/m²      Results for orders placed or performed in visit on 02/15/23   AMB POC URINALYSIS DIP STICK MANUAL W/O MICRO   Result Value Ref Range    Color (UA POC) Yellow     Clarity (UA POC) Clear     Glucose (UA POC) Negative Negative    Bilirubin (UA POC) Negative Negative    Ketones (UA POC) Negative Negative    Specific gravity (UA POC) 1.025 1.001 - 1.035    Blood (UA POC) Trace Negative    pH (UA POC) 6.0 4.6 - 8.0    Protein (UA POC) Negative Negative    Urobilinogen (UA POC) 0.2 mg/dL 0.2 - 1    Nitrites (UA POC) Negative Negative    Leukocyte esterase (UA POC) Negative Negative   AMB POC URINE PREGNANCY TEST, VISUAL COLOR COMPARISON   Result Value Ref Range    VALID INTERNAL CONTROL POC Yes     HCG urine, Ql. (POC) Negative Negative     ASSESSMENT and PLAN  1. Right lower quadrant abdominal pain  -     AMB POC URINALYSIS DIP STICK MANUAL W/O MICRO  -     AMB POC URINE PREGNANCY TEST, VISUAL COLOR COMPARISON  DX; acute appendicitis, kidney stone, ovarian cyst    Discussed possible Dx with patient and advised to have further investigated at the ED at [facility]. She agrees with plan. She declines EMS and will go across the street by POV. Attempted to call charge nurse without success.   TERRI Townsend

## 2023-02-15 NOTE — PROGRESS NOTES
Identified pt with two pt identifiers(name and ). Reviewed record in preparation for visit and have obtained necessary documentation. Chief Complaint   Patient presents with    Abdominal Pain     Right lower quadrant; radiates into back; throbbing/aching; started this morning; BM today - normal      Vitals:    02/15/23 1358 02/15/23 1403   BP: (!) 164/101 (!) 160/99   Pulse: 81    Resp: 16    Temp: 97.9 °F (36.6 °C)    TempSrc: Temporal    SpO2: 98%    Weight: 141 lb (64 kg)    Height: 5' 4\" (1.626 m)    PainSc:   7    PainLoc: Abdomen        Health Maintenance Review: Patient reminded of \"due or due soon\" health maintenance. I have asked the patient to contact his/her primary care provider (PCP) for follow-up on his/her health maintenance. Coordination of Care Questionnaire:  :   1) Have you been to an emergency room, urgent care, or hospitalized since your last visit? If yes, where when, and reason for visit? no       2. Have seen or consulted any other health care provider since your last visit? If yes, where when, and reason for visit? NO      Patient is accompanied by self I have received verbal consent from Hakan Napoles to discuss any/all medical information while they are present in the room.

## 2024-09-06 ENCOUNTER — HOSPITAL ENCOUNTER (EMERGENCY)
Facility: HOSPITAL | Age: 39
Discharge: HOME | End: 2024-09-06
Attending: EMERGENCY MEDICINE
Payer: COMMERCIAL

## 2024-09-06 ENCOUNTER — APPOINTMENT (OUTPATIENT)
Facility: HOSPITAL | Age: 39
End: 2024-09-06
Payer: COMMERCIAL

## 2024-09-06 VITALS
HEIGHT: 64 IN | RESPIRATION RATE: 19 BRPM | WEIGHT: 145 LBS | DIASTOLIC BLOOD PRESSURE: 91 MMHG | HEART RATE: 80 BPM | BODY MASS INDEX: 24.75 KG/M2 | SYSTOLIC BLOOD PRESSURE: 121 MMHG | OXYGEN SATURATION: 96 %

## 2024-09-06 DIAGNOSIS — S60.021A CONTUSION OF RIGHT INDEX FINGER WITHOUT DAMAGE TO NAIL, INITIAL ENCOUNTER: ICD-10-CM

## 2024-09-06 DIAGNOSIS — S61.210A LACERATION OF RIGHT INDEX FINGER WITHOUT FOREIGN BODY WITHOUT DAMAGE TO NAIL, INITIAL ENCOUNTER: Primary | ICD-10-CM

## 2024-09-06 PROCEDURE — 12001 RPR S/N/AX/GEN/TRNK 2.5CM/<: CPT

## 2024-09-06 PROCEDURE — 99283 EMERGENCY DEPT VISIT LOW MDM: CPT

## 2024-09-06 PROCEDURE — 73140 X-RAY EXAM OF FINGER(S): CPT

## 2024-09-06 ASSESSMENT — PAIN SCALES - GENERAL: PAINLEVEL_OUTOF10: 4

## 2024-09-06 NOTE — ED PROVIDER NOTES
[facility]  EMERGENCY DEPARTMENT ENCOUNTER      Pt Name: Mallika Avalos  MRN: [id number]  Birthdate 1985  Date of evaluation: 9/6/2024  Provider: Vidal Romano DO    CHIEF COMPLAINT       Chief Complaint   Patient presents with    Finger Injury    Laceration         HISTORY OF PRESENT ILLNESS   (Location/Symptom, Timing/Onset, Context/Setting, Quality, Duration, Modifying Factors, Severity)  Note limiting factors.   39-year-old female comes in for finger injury.  Patient slammed her right second digit in the car door this morning.  She comes in with a small laceration to the distal pad of her right dominant second digit.  She denies other complaints            Review of External Medical Records:     Nursing Notes were reviewed.    REVIEW OF SYSTEMS    (2-9 systems for level 4, 10 or more for level 5)     Review of Systems    Except as noted above the remainder of the review of systems was reviewed and negative.       PAST MEDICAL HISTORY     Past Medical History:   Diagnosis Date    IBS (irritable bowel syndrome) 2019         SURGICAL HISTORY       Past Surgical History:   Procedure Laterality Date    GYN           CURRENT MEDICATIONS       Previous Medications    CETIRIZINE HCL 10 MG CAPS    Take 10 mg by mouth daily    CYANOCOBALAMIN 1000 MCG TABLET    Take 1,000 mcg by mouth daily    ESCITALOPRAM (LEXAPRO) 10 MG TABLET    Take 15 mg by mouth daily    SPIRONOLACTONE (ALDACTONE) 100 MG TABLET    Take 100 mg by mouth daily       ALLERGIES     Codeine    FAMILY HISTORY       Family History   Problem Relation Age of Onset    Hypertension Father           SOCIAL HISTORY       Social History     Socioeconomic History    Marital status:      Spouse name: None    Number of children: None    Years of education: None    Highest education level: None   Tobacco Use    Smoking status: Never    Smokeless tobacco: Former   Substance and Sexual Activity    Alcohol use: Yes    Drug use: Not Currently  initial encounter    2. Contusion of right index finger without damage to nail, initial encounter          DISPOSITION/PLAN   DISPOSITION Decision To Discharge 09/06/2024 10:10:16 AM      PATIENT REFERRED TO:  Gaviota Hough MD  [address] [facility] [address] [phone]    Schedule an appointment as soon as possible for a visit         DISCHARGE MEDICATIONS:  New Prescriptions    No medications on file         (Please note that portions of this note were completed with a voice recognition program.  Efforts were made to edit the dictations but occasionally words are mis-transcribed.)    Vidal Romano DO (electronically signed)  Emergency Attending Physician / Physician Assistant / Nurse Practitioner            Vidal Romano DO  09/06/24 1013

## 2024-09-06 NOTE — ED TRIAGE NOTES
Pt c/o slamming her right pointer finger in her car door just PTA. +cut to pad of finger, no active bleeding TDAP UTD.

## 2025-06-28 ENCOUNTER — OFFICE VISIT (OUTPATIENT)
Age: 40
End: 2025-06-28

## 2025-06-28 VITALS
DIASTOLIC BLOOD PRESSURE: 87 MMHG | SYSTOLIC BLOOD PRESSURE: 126 MMHG | RESPIRATION RATE: 20 BRPM | BODY MASS INDEX: 26.43 KG/M2 | TEMPERATURE: 98.1 F | HEART RATE: 87 BPM | WEIGHT: 154 LBS

## 2025-06-28 DIAGNOSIS — S05.02XA ABRASION OF LEFT CORNEA, INITIAL ENCOUNTER: Primary | ICD-10-CM

## 2025-06-28 RX ORDER — ERYTHROMYCIN 5 MG/G
OINTMENT OPHTHALMIC
Qty: 1 G | Refills: 0 | Status: SHIPPED | OUTPATIENT
Start: 2025-06-28 | End: 2025-07-08

## 2025-06-28 ASSESSMENT — VISUAL ACUITY: OU: 1

## 2025-06-28 NOTE — PROGRESS NOTES
Mallika Avalos (:  1985) is a 39 y.o. female,Established patient, here for evaluation of the following chief complaint(s):  Eye Problem (Patient was doing yard work today and while using the weed eater a rock flew into the air and hit her in her left eye.  )      Assessment & Plan :  Visit Diagnoses and Associated Orders         Abrasion of left cornea, initial encounter    -  Primary    erythromycin (ROMYCIN) 5 MG/GM ophthalmic ointment [2888]                   Patient was seen today for evaluation of a corneal abrasion to her left eye  I see no evidence of foreign body, no exudates or ulcerations  No report of flashes, floaters, halos around lights or other signs or symptoms of acute ocular emergency  Will treat with erythromycin ointment, please apply a 1 cm ribbon to the left lower eyelid 4 times daily for the next 5 to 7 days  If you begin to experience any worsening eye pain, worsening blurry vision, flashes, floaters, halos around lights or other acute vision changes, please follow-up with ophthalmology immediately       Subjective :    Eye Problem          39 y.o. female presents with symptoms of left eye pain which began just earlier today.  She states she was outside doing some yard work and the weedeater kicked up a rock which struck her in the eye.  She felt like the rock did hit her in the eyeball itself.  She reports a mild blurriness in the center of her vision.  Denies any flashes, floaters, halos around lights or other acute vision changes.  She denies any contact lens use.  She denies any discharge or drainage from the eye.         Vitals:    25 1529   BP: 126/87   BP Site: Left Upper Arm   Patient Position: Sitting   BP Cuff Size: Medium Adult   Pulse: 87   Resp: 20   Temp: 98.1 °F (36.7 °C)   TempSrc: Oral   Weight: 69.9 kg (154 lb)       No results found for this visit on 25.      Objective   Physical Exam  Vitals and nursing note reviewed.   Constitutional:       General:

## 2025-06-28 NOTE — PATIENT INSTRUCTIONS
Patient was seen today for evaluation of a corneal abrasion to her left eye  I see no evidence of foreign body, no exudates or ulcerations  No report of flashes, floaters, halos around lights or other signs or symptoms of acute ocular emergency  Will treat with erythromycin ointment, please apply a 1 cm ribbon to the left lower eyelid 4 times daily for the next 5 to 7 days  If you begin to experience any worsening eye pain, worsening blurry vision, flashes, floaters, halos around lights or other acute vision changes, please follow-up with ophthalmology immediately